# Patient Record
Sex: MALE | Race: WHITE | NOT HISPANIC OR LATINO | Employment: OTHER | URBAN - METROPOLITAN AREA
[De-identification: names, ages, dates, MRNs, and addresses within clinical notes are randomized per-mention and may not be internally consistent; named-entity substitution may affect disease eponyms.]

---

## 2019-01-11 PROBLEM — R06.00 DYSPNEA: Status: ACTIVE | Noted: 2019-01-11

## 2019-01-12 PROBLEM — E11.9 DM2 (DIABETES MELLITUS, TYPE 2): Chronic | Status: ACTIVE | Noted: 2019-01-12

## 2019-01-12 PROBLEM — R55 NEAR SYNCOPE: Status: ACTIVE | Noted: 2019-01-12

## 2019-01-12 PROBLEM — E87.6 HYPOKALEMIA: Status: ACTIVE | Noted: 2019-01-12

## 2019-01-12 PROBLEM — I44.30 AVB (ATRIOVENTRICULAR BLOCK): Status: ACTIVE | Noted: 2019-01-12

## 2019-01-13 PROBLEM — R55 SYNCOPE: Status: ACTIVE | Noted: 2019-01-13

## 2019-02-15 PROBLEM — I26.99 ACUTE PULMONARY EMBOLISM: Status: ACTIVE | Noted: 2019-02-15

## 2019-02-15 PROBLEM — I26.99 PULMONARY EMBOLUS: Status: ACTIVE | Noted: 2019-02-15

## 2019-02-17 PROBLEM — I26.99 PE (PULMONARY THROMBOEMBOLISM): Status: RESOLVED | Noted: 2019-02-15 | Resolved: 2019-02-17

## 2019-02-17 PROBLEM — Z51.81 ENCOUNTER FOR MONITORING ANTIPLATELET THERAPY: Status: ACTIVE | Noted: 2019-02-17

## 2019-02-17 PROBLEM — Z79.02 ENCOUNTER FOR MONITORING ANTIPLATELET THERAPY: Status: ACTIVE | Noted: 2019-02-17

## 2019-02-17 PROBLEM — Z95.0 PACEMAKER: Status: ACTIVE | Noted: 2019-02-17

## 2019-02-17 PROBLEM — I82.409 ACUTE DVT (DEEP VENOUS THROMBOSIS): Status: ACTIVE | Noted: 2019-02-17

## 2021-02-02 PROBLEM — G46.3 BRAINSTEM STROKE SYNDROME: Status: ACTIVE | Noted: 2021-02-02

## 2021-02-02 PROBLEM — I63.9 CEREBROVASCULAR ACCIDENT (CVA): Status: ACTIVE | Noted: 2021-02-02

## 2021-02-03 PROBLEM — H53.2 DIPLOPIA: Status: ACTIVE | Noted: 2021-02-03

## 2023-10-02 PROBLEM — L03.90 CELLULITIS: Status: ACTIVE | Noted: 2023-10-02

## 2023-10-02 PROBLEM — N18.31 STAGE 3A CHRONIC KIDNEY DISEASE: Status: ACTIVE | Noted: 2023-10-02

## 2023-10-02 PROBLEM — D72.829 LEUKOCYTOSIS: Status: ACTIVE | Noted: 2023-10-02

## 2023-10-02 PROBLEM — D64.9 NORMOCYTIC ANEMIA: Status: ACTIVE | Noted: 2023-10-02

## 2023-10-02 PROBLEM — I50.32 CHRONIC HEART FAILURE WITH PRESERVED EJECTION FRACTION (HFPEF): Status: ACTIVE | Noted: 2023-10-02

## 2023-10-02 PROBLEM — D53.9 MACROCYTIC ANEMIA: Status: ACTIVE | Noted: 2023-10-02

## 2023-10-02 PROBLEM — E80.6 HYPERBILIRUBINEMIA: Status: ACTIVE | Noted: 2023-10-02

## 2023-10-02 PROBLEM — E66.09 CLASS 1 OBESITY DUE TO EXCESS CALORIES WITH SERIOUS COMORBIDITY AND BODY MASS INDEX (BMI) OF 31.0 TO 31.9 IN ADULT: Status: ACTIVE | Noted: 2023-10-02

## 2023-10-02 PROBLEM — I48.0 PAF (PAROXYSMAL ATRIAL FIBRILLATION): Status: ACTIVE | Noted: 2023-10-02

## 2023-10-02 PROBLEM — Z79.899 POLYPHARMACY: Status: ACTIVE | Noted: 2019-02-17

## 2023-10-05 PROBLEM — M65.10 INFECTIOUS TENOSYNOVITIS: Status: ACTIVE | Noted: 2023-10-05

## 2023-10-09 PROBLEM — L02.413 ABSCESS OF RIGHT UPPER EXTREMITY: Status: ACTIVE | Noted: 2023-10-09

## 2023-10-09 PROBLEM — E63.9 INADEQUATE DIETARY ENERGY INTAKE: Status: ACTIVE | Noted: 2023-10-09

## 2023-10-14 ENCOUNTER — LAB VISIT (OUTPATIENT)
Dept: LAB | Facility: HOSPITAL | Age: 88
End: 2023-10-14
Attending: HOSPITALIST
Payer: MEDICARE

## 2023-10-14 DIAGNOSIS — R30.0 DYSURIA: Primary | ICD-10-CM

## 2023-10-14 LAB
BILIRUB UR QL STRIP: NEGATIVE
CLARITY UR: CLEAR
COLOR UR: YELLOW
GLUCOSE UR QL STRIP: NEGATIVE
HGB UR QL STRIP: ABNORMAL
KETONES UR QL STRIP: NEGATIVE
LEUKOCYTE ESTERASE UR QL STRIP: NEGATIVE
NITRITE UR QL STRIP: NEGATIVE
PH UR STRIP: 8 [PH] (ref 5–8)
PROT UR QL STRIP: NEGATIVE
SP GR UR STRIP: 1.02 (ref 1–1.03)
URN SPEC COLLECT METH UR: ABNORMAL
UROBILINOGEN UR STRIP-ACNC: NEGATIVE EU/DL

## 2023-10-14 PROCEDURE — 81003 URINALYSIS AUTO W/O SCOPE: CPT

## 2023-10-14 PROCEDURE — 87086 URINE CULTURE/COLONY COUNT: CPT

## 2023-10-15 LAB — BACTERIA UR CULT: NO GROWTH

## 2023-10-25 ENCOUNTER — HOSPITAL ENCOUNTER (INPATIENT)
Facility: HOSPITAL | Age: 88
LOS: 6 days | Discharge: ANOTHER HOSPITAL | DRG: 698 | End: 2023-10-31
Attending: EMERGENCY MEDICINE | Admitting: FAMILY MEDICINE
Payer: MEDICARE

## 2023-10-25 DIAGNOSIS — J81.1 PULMONARY EDEMA: ICD-10-CM

## 2023-10-25 DIAGNOSIS — R06.00 DYSPNEA: ICD-10-CM

## 2023-10-25 DIAGNOSIS — A41.50 SEPSIS DUE TO GRAM-NEGATIVE UTI: Primary | ICD-10-CM

## 2023-10-25 DIAGNOSIS — N39.0 SEPSIS DUE TO GRAM-NEGATIVE UTI: Primary | ICD-10-CM

## 2023-10-25 DIAGNOSIS — I50.9 HF (HEART FAILURE): ICD-10-CM

## 2023-10-25 DIAGNOSIS — D70.3 NEUTROPENIA ASSOCIATED WITH INFECTION: ICD-10-CM

## 2023-10-25 DIAGNOSIS — R94.31 PROLONGED QT INTERVAL: ICD-10-CM

## 2023-10-25 LAB
ALBUMIN SERPL BCP-MCNC: 2.3 G/DL (ref 3.5–5.2)
ALP SERPL-CCNC: 90 U/L (ref 55–135)
ALT SERPL W/O P-5'-P-CCNC: 20 U/L (ref 10–44)
ANION GAP SERPL CALC-SCNC: 10 MMOL/L (ref 8–16)
AST SERPL-CCNC: 35 U/L (ref 10–40)
BACTERIA #/AREA URNS HPF: ABNORMAL /HPF
BASOPHILS # BLD AUTO: 0.01 K/UL (ref 0–0.2)
BASOPHILS NFR BLD: 0.2 % (ref 0–1.9)
BILIRUB SERPL-MCNC: 0.7 MG/DL (ref 0.1–1)
BILIRUB UR QL STRIP: NEGATIVE
BNP SERPL-MCNC: 217 PG/ML (ref 0–99)
BUN SERPL-MCNC: 16 MG/DL (ref 8–23)
CALCIUM SERPL-MCNC: 7.9 MG/DL (ref 8.7–10.5)
CHLORIDE SERPL-SCNC: 103 MMOL/L (ref 95–110)
CK SERPL-CCNC: 71 U/L (ref 20–200)
CLARITY UR: ABNORMAL
CO2 SERPL-SCNC: 22 MMOL/L (ref 23–29)
COLOR UR: YELLOW
CREAT SERPL-MCNC: 1.4 MG/DL (ref 0.5–1.4)
DIFFERENTIAL METHOD: ABNORMAL
EOSINOPHIL # BLD AUTO: 0 K/UL (ref 0–0.5)
EOSINOPHIL NFR BLD: 0 % (ref 0–8)
ERYTHROCYTE [DISTWIDTH] IN BLOOD BY AUTOMATED COUNT: 13.5 % (ref 11.5–14.5)
EST. GFR  (NO RACE VARIABLE): 49 ML/MIN/1.73 M^2
GLUCOSE SERPL-MCNC: 93 MG/DL (ref 70–110)
GLUCOSE UR QL STRIP: NEGATIVE
HCT VFR BLD AUTO: 30.9 % (ref 40–54)
HGB BLD-MCNC: 10.4 G/DL (ref 14–18)
HGB UR QL STRIP: ABNORMAL
IMM GRANULOCYTES # BLD AUTO: 0.02 K/UL (ref 0–0.04)
IMM GRANULOCYTES NFR BLD AUTO: 0.4 % (ref 0–0.5)
KETONES UR QL STRIP: NEGATIVE
LACTATE SERPL-SCNC: 0.7 MMOL/L (ref 0.5–2.2)
LEUKOCYTE ESTERASE UR QL STRIP: ABNORMAL
LYMPHOCYTES # BLD AUTO: 0.7 K/UL (ref 1–4.8)
LYMPHOCYTES NFR BLD: 13.2 % (ref 18–48)
MCH RBC QN AUTO: 36.9 PG (ref 27–31)
MCHC RBC AUTO-ENTMCNC: 33.7 G/DL (ref 32–36)
MCV RBC AUTO: 110 FL (ref 82–98)
MICROSCOPIC COMMENT: ABNORMAL
MONOCYTES # BLD AUTO: 0.9 K/UL (ref 0.3–1)
MONOCYTES NFR BLD: 18.8 % (ref 4–15)
NEUTROPHILS # BLD AUTO: 3.3 K/UL (ref 1.8–7.7)
NEUTROPHILS NFR BLD: 67.4 % (ref 38–73)
NITRITE UR QL STRIP: POSITIVE
NRBC BLD-RTO: 0 /100 WBC
PH UR STRIP: 5 [PH] (ref 5–8)
PLATELET # BLD AUTO: 168 K/UL (ref 150–450)
PMV BLD AUTO: 9.5 FL (ref 9.2–12.9)
POTASSIUM SERPL-SCNC: 4 MMOL/L (ref 3.5–5.1)
PROT SERPL-MCNC: 6 G/DL (ref 6–8.4)
PROT UR QL STRIP: NEGATIVE
RBC # BLD AUTO: 2.82 M/UL (ref 4.6–6.2)
RBC #/AREA URNS HPF: 4 /HPF (ref 0–4)
SARS-COV-2 RDRP RESP QL NAA+PROBE: POSITIVE
SODIUM SERPL-SCNC: 135 MMOL/L (ref 136–145)
SP GR UR STRIP: 1.01 (ref 1–1.03)
TROPONIN I SERPL DL<=0.01 NG/ML-MCNC: 0.05 NG/ML (ref 0–0.03)
TROPONIN I SERPL DL<=0.01 NG/ML-MCNC: 0.09 NG/ML (ref 0–0.03)
URN SPEC COLLECT METH UR: ABNORMAL
UROBILINOGEN UR STRIP-ACNC: NEGATIVE EU/DL
WBC # BLD AUTO: 4.94 K/UL (ref 3.9–12.7)
WBC #/AREA URNS HPF: 20 /HPF (ref 0–5)
WBC CLUMPS URNS QL MICRO: ABNORMAL
YEAST URNS QL MICRO: ABNORMAL

## 2023-10-25 PROCEDURE — 99285 EMERGENCY DEPT VISIT HI MDM: CPT | Mod: 25

## 2023-10-25 PROCEDURE — 25000003 PHARM REV CODE 250: Performed by: EMERGENCY MEDICINE

## 2023-10-25 PROCEDURE — 87186 SC STD MICRODIL/AGAR DIL: CPT | Performed by: EMERGENCY MEDICINE

## 2023-10-25 PROCEDURE — 36415 COLL VENOUS BLD VENIPUNCTURE: CPT | Performed by: EMERGENCY MEDICINE

## 2023-10-25 PROCEDURE — U0002 COVID-19 LAB TEST NON-CDC: HCPCS | Performed by: STUDENT IN AN ORGANIZED HEALTH CARE EDUCATION/TRAINING PROGRAM

## 2023-10-25 PROCEDURE — 83880 ASSAY OF NATRIURETIC PEPTIDE: CPT | Performed by: EMERGENCY MEDICINE

## 2023-10-25 PROCEDURE — 81000 URINALYSIS NONAUTO W/SCOPE: CPT | Performed by: EMERGENCY MEDICINE

## 2023-10-25 PROCEDURE — 25500020 PHARM REV CODE 255: Performed by: EMERGENCY MEDICINE

## 2023-10-25 PROCEDURE — 84484 ASSAY OF TROPONIN QUANT: CPT | Mod: 91 | Performed by: EMERGENCY MEDICINE

## 2023-10-25 PROCEDURE — 82550 ASSAY OF CK (CPK): CPT | Performed by: EMERGENCY MEDICINE

## 2023-10-25 PROCEDURE — 87088 URINE BACTERIA CULTURE: CPT | Performed by: EMERGENCY MEDICINE

## 2023-10-25 PROCEDURE — 93005 ELECTROCARDIOGRAM TRACING: CPT

## 2023-10-25 PROCEDURE — 84484 ASSAY OF TROPONIN QUANT: CPT | Performed by: STUDENT IN AN ORGANIZED HEALTH CARE EDUCATION/TRAINING PROGRAM

## 2023-10-25 PROCEDURE — 11000001 HC ACUTE MED/SURG PRIVATE ROOM

## 2023-10-25 PROCEDURE — 87086 URINE CULTURE/COLONY COUNT: CPT | Performed by: EMERGENCY MEDICINE

## 2023-10-25 PROCEDURE — 93010 EKG 12-LEAD: ICD-10-PCS | Mod: ,,, | Performed by: INTERNAL MEDICINE

## 2023-10-25 PROCEDURE — 80053 COMPREHEN METABOLIC PANEL: CPT | Performed by: EMERGENCY MEDICINE

## 2023-10-25 PROCEDURE — 51702 INSERT TEMP BLADDER CATH: CPT

## 2023-10-25 PROCEDURE — 36415 COLL VENOUS BLD VENIPUNCTURE: CPT | Performed by: STUDENT IN AN ORGANIZED HEALTH CARE EDUCATION/TRAINING PROGRAM

## 2023-10-25 PROCEDURE — 83605 ASSAY OF LACTIC ACID: CPT | Performed by: EMERGENCY MEDICINE

## 2023-10-25 PROCEDURE — 87077 CULTURE AEROBIC IDENTIFY: CPT | Performed by: EMERGENCY MEDICINE

## 2023-10-25 PROCEDURE — 93010 ELECTROCARDIOGRAM REPORT: CPT | Mod: ,,, | Performed by: INTERNAL MEDICINE

## 2023-10-25 PROCEDURE — 96365 THER/PROPH/DIAG IV INF INIT: CPT

## 2023-10-25 PROCEDURE — 63600175 PHARM REV CODE 636 W HCPCS: Performed by: EMERGENCY MEDICINE

## 2023-10-25 PROCEDURE — 85025 COMPLETE CBC W/AUTO DIFF WBC: CPT | Performed by: EMERGENCY MEDICINE

## 2023-10-25 PROCEDURE — 87040 BLOOD CULTURE FOR BACTERIA: CPT | Mod: 59 | Performed by: EMERGENCY MEDICINE

## 2023-10-25 RX ORDER — LEVOFLOXACIN 5 MG/ML
750 INJECTION, SOLUTION INTRAVENOUS
Status: COMPLETED | OUTPATIENT
Start: 2023-10-25 | End: 2023-10-25

## 2023-10-25 RX ORDER — ACETAMINOPHEN 500 MG
1000 TABLET ORAL
Status: COMPLETED | OUTPATIENT
Start: 2023-10-25 | End: 2023-10-25

## 2023-10-25 RX ADMIN — ACETAMINOPHEN 1000 MG: 500 TABLET ORAL at 03:10

## 2023-10-25 RX ADMIN — IOHEXOL 100 ML: 350 INJECTION, SOLUTION INTRAVENOUS at 07:10

## 2023-10-25 RX ADMIN — LEVOFLOXACIN 750 MG: 750 INJECTION, SOLUTION INTRAVENOUS at 05:10

## 2023-10-25 RX ADMIN — SODIUM CHLORIDE 1000 ML: 9 INJECTION, SOLUTION INTRAVENOUS at 05:10

## 2023-10-25 NOTE — ED NOTES
Hourly Patient Rounds     Patient lying in bed, on back, and resting. Family absent at bedside.   Side rails up x 2 Call light within reach.   Pt AAO x 2 and in no distress.  Pain, position, and potty addressed.    Updated pt on plan of care and answered all questions.   Door closed for privacy.    Spoke with pt daughter, Rahel. POC discussed. Requested updates going forward.

## 2023-10-25 NOTE — ED NOTES
Pt escorted from triage/moved over from ambulance stretcher to ER stretcher.   Pt placed on cardiac monitor, continuous pulse ox, and NIBP cuff set to cycle.   Pt ( & visitors) educated on cardiac monitoring.

## 2023-10-25 NOTE — ED NOTES
Hourly Patient Rounds     Patient lying in bed, on back, and resting. Family absent at bedside.   Side rails up x 2. Call light within reach.   Pt AAO x 2 and in no distress.  Pain, position, and potty addressed.    Updated pt on plan of care and answered all questions.   Door closed for privacy.

## 2023-10-25 NOTE — ED PROVIDER NOTES
[facility]                                                  Chief Complaint  88 y.o. male with Fever (BIBA from [facility] with reports of positive covid, fever, hypoxia (84%), and generalized weakness x 2 days. /)    History of Present Illness  Debbie Zepeda presents to the emergency room brought in by EMS from [facility].  Patient was sent over for fever, hypoxia, generalized weakness and known positive COVID test recently.  On arrival patient is not hypoxic but rather has 97% saturation on room air.  Patient states that his back is hurting him.  He appears dehydrated.    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     Past Surgical History:   Procedure Laterality Date    CHOLECYSTECTOMY      INCISION AND DRAINAGE OF HAND Right 10/6/2023    Procedure: INCISION AND DRAINAGE, HAND, IRRIGATION AND DRAINAGE;  Surgeon: Peter Silvestre MD;  Location: [facility] OR;  Service: Orthopedics;  Laterality: Right;  hand  local requested per the family  7am per Mahsa    INSERTION OF PACEMAKER N/A 1/14/2019    Procedure: INSERTION, PACEMAKER;  Surgeon: Sabino Galicia MD;  Location: [facility] CATH;  Service: Cardiology;  Laterality: N/A;      Review of patient's allergies indicates:  No Known Allergies     Review of Systems and Physical Exam     Review of Systems  -- Constitution - has fever, no weight loss, no loss of consciousness known COVID  -- Eyes - no changes in vision, no redness, no swelling  -- Ear, Nose - no  earache, denies congestion  -- Mouth,Throat - no sore throat, no toothache, normal voice, normal swallowing  -- Respiratory - denies cough and congestion, no shortness of breath, no wheezing  -- Cardiovascular - denies chest pain, no palpitations,   -- Gastrointestinal - denies abdominal pain, denies nausea, vomiting, and  "diarrhea  -- Genitourinary - no dysuria, no denies flank pain, no hematuria or frequency   -- Musculoskeletal - reports back pain, negative for myalgias and arthralgias   -- Neurological - no headache, no neurologic changes, no loss of bladder or bowel function no seizure like activity, no changes in hearing or vision  -- Skin - denies skin changes, no rash, no hives, no suspected skin infection    Vital Signs   height is 6' 2" (1.88 m). His oral temperature is 98.3 °F (36.8 °C). His blood pressure is 113/55 (abnormal) and his pulse is 60. His respiration is 31 (abnormal) and oxygen saturation is 97%.      Physical Exam  -- Nursing note and vitals reviewed  -- Constitutional:  Awake alert and oriented to self and situation not time GCS 15, no acute distress.  Appears well.  -- Head: Atraumatic. Normocephalic. No obvious abnormality  -- Eyes: Pupils are equal and reactive to light. Extraocular movements intact. No nystagmus.  No periorbital swelling. Normal conjunctiva.  -- Nose: Nose grossly normal in appearance, nares grossly normal. No rhinorrhea.  -- Throat: Mucous membranes dry pharynx normal, normal tonsils.  Airway patent.  -- Ears: External ears and TM normal bilaterally. Normal hearing.   -- Neck: Normal range of motion. Neck supple. No meningismus. No adenopathy  -- Cardiac: Normal rate, regular rhythm and normal heart sounds. No carotid bruit. No lower extremity edema.  -- Pulmonary: Normal respiratory effort, breath sounds equal bilaterally. Adequate flow.  No wheezing.  Faint bilateral crackles.  -- Abdominal: Soft, no tenderness, no guarding, no rebound. Normal bowel sounds.   -- Musculoskeletal: Normal range of motion, all 4 extremities 5/5 strength.  Neurovascularly intact. Atraumatic. No deformities.  -- Neurological:  Cranial nerves 2-12 grossly intact. No focal deficits.   -- Vascular: Posterior tibial, dorsalis pedis and radial pulses 2+ bilaterally    -- Lymphatics: No cervical or peripheral " lymphadenopathy.   -- Skin: Warm and dry. No evidence of rash or cellulitis  -- Psychiatric: Normal mood and affect. Bedside behavior is appropriate.  Patient is cooperative.  Denies suicidal homicidal ideation.    Emergency Room Course     Treatment Course, Evaluation, and Medical Decision Making  1. Physical exam significant for dry mucous membranes and faint bilateral crackles patient meets sepsis criteria  2.  EKG atrial fibrillation with unknown chronicity  3. CBC/CMP with baseline anemia and hypocalcemia  4. Urinalysis within nitrite positive UTI, many bacteria, urine culture pending.  Patient given Levaquin IV  5. Chest x-ray with left-sided pleural effusion  6. CT chest abdomen and pelvis pending   7. BNP within normal limits  8. Troponin pending   9. Blood cultures pending lactic acid  10. Care transitioned to Dr. Jenkins at 1800       Abnormal lab values  Labs Reviewed   CBC W/ AUTO DIFFERENTIAL - Abnormal; Notable for the following components:       Result Value    RBC 2.82 (*)     Hemoglobin 10.4 (*)     Hematocrit 30.9 (*)      (*)     MCH 36.9 (*)     Lymph # 0.7 (*)     Lymph % 13.2 (*)     Mono % 18.8 (*)     All other components within normal limits   COMPREHENSIVE METABOLIC PANEL - Abnormal; Notable for the following components:    Sodium 135 (*)     CO2 22 (*)     Calcium 7.9 (*)     Albumin 2.3 (*)     eGFR 49 (*)     All other components within normal limits   B-TYPE NATRIURETIC PEPTIDE - Abnormal; Notable for the following components:     (*)     All other components within normal limits   URINALYSIS - Abnormal; Notable for the following components:    Appearance, UA Hazy (*)     Occult Blood UA 1+ (*)     Nitrite, UA Positive (*)     Leukocytes, UA 1+ (*)     All other components within normal limits   URINALYSIS MICROSCOPIC - Abnormal; Notable for the following components:    WBC, UA 20 (*)     WBC Clumps, UA Occasional (*)     Bacteria Many (*)     Yeast, UA Few (*)     All  other components within normal limits   CULTURE, URINE       Medications Given  Medications   levoFLOXacin 750 mg/150 mL IVPB 750 mg (has no administration in time range)   acetaminophen tablet 1,000 mg (1,000 mg Oral Given 10/25/23 1501)              Suri Blum MD  10/25/23 1852       Suri Blum MD  10/25/23 0499

## 2023-10-26 PROBLEM — A41.50 SEPSIS DUE TO GRAM-NEGATIVE UTI: Status: ACTIVE | Noted: 2023-10-26

## 2023-10-26 PROBLEM — E11.59 TYPE 2 DIABETES MELLITUS WITH CIRCULATORY DISORDER: Status: ACTIVE | Noted: 2019-01-12

## 2023-10-26 PROBLEM — N39.0 SEPSIS DUE TO GRAM-NEGATIVE UTI: Status: ACTIVE | Noted: 2023-10-26

## 2023-10-26 PROBLEM — N18.9 ACUTE KIDNEY INJURY SUPERIMPOSED ON CKD: Status: ACTIVE | Noted: 2023-10-26

## 2023-10-26 PROBLEM — I48.91 ATRIAL FIBRILLATION: Status: ACTIVE | Noted: 2023-10-02

## 2023-10-26 PROBLEM — U07.1 COVID-19: Status: ACTIVE | Noted: 2023-10-26

## 2023-10-26 PROBLEM — N17.9 ACUTE KIDNEY INJURY SUPERIMPOSED ON CKD: Status: ACTIVE | Noted: 2023-10-26

## 2023-10-26 PROBLEM — N39.0 COMPLICATED URINARY TRACT INFECTION: Status: ACTIVE | Noted: 2023-10-26

## 2023-10-26 PROBLEM — J90 PLEURAL EFFUSION: Status: ACTIVE | Noted: 2023-10-26

## 2023-10-26 LAB
ALBUMIN SERPL BCP-MCNC: 2.1 G/DL (ref 3.5–5.2)
ALP SERPL-CCNC: 90 U/L (ref 55–135)
ALT SERPL W/O P-5'-P-CCNC: 19 U/L (ref 10–44)
ANION GAP SERPL CALC-SCNC: 11 MMOL/L (ref 8–16)
ASCENDING AORTA: 3.08 CM
AST SERPL-CCNC: 37 U/L (ref 10–40)
AV INDEX (PROSTH): 0.91
AV MEAN GRADIENT: 1 MMHG
AV PEAK GRADIENT: 2 MMHG
AV VALVE AREA BY VELOCITY RATIO: 4.37 CM²
AV VALVE AREA: 4.13 CM²
AV VELOCITY RATIO: 0.96
BASOPHILS # BLD AUTO: 0.01 K/UL (ref 0–0.2)
BASOPHILS NFR BLD: 0.3 % (ref 0–1.9)
BILIRUB SERPL-MCNC: 0.6 MG/DL (ref 0.1–1)
BUN SERPL-MCNC: 20 MG/DL (ref 8–23)
CALCIUM SERPL-MCNC: 7.7 MG/DL (ref 8.7–10.5)
CHLORIDE SERPL-SCNC: 103 MMOL/L (ref 95–110)
CO2 SERPL-SCNC: 21 MMOL/L (ref 23–29)
CREAT SERPL-MCNC: 1.3 MG/DL (ref 0.5–1.4)
CV ECHO LV RWT: 0.54 CM
DIFFERENTIAL METHOD: ABNORMAL
DOP CALC AO PEAK VEL: 0.72 M/S
DOP CALC AO VTI: 12.7 CM
DOP CALC LVOT AREA: 4.6 CM2
DOP CALC LVOT DIAMETER: 2.41 CM
DOP CALC LVOT PEAK VEL: 0.69 M/S
DOP CALC LVOT STROKE VOLUME: 52.43 CM3
DOP CALC RVOT PEAK VEL: 0.79 M/S
DOP CALC RVOT VTI: 15.1 CM
DOP CALCLVOT PEAK VEL VTI: 11.5 CM
E WAVE DECELERATION TIME: 288.23 MSEC
E/A RATIO: 2.52
E/E' RATIO: 11.86 M/S
ECHO LV POSTERIOR WALL: 1.19 CM (ref 0.6–1.1)
EOSINOPHIL # BLD AUTO: 0 K/UL (ref 0–0.5)
EOSINOPHIL NFR BLD: 0 % (ref 0–8)
ERYTHROCYTE [DISTWIDTH] IN BLOOD BY AUTOMATED COUNT: 13.2 % (ref 11.5–14.5)
EST. GFR  (NO RACE VARIABLE): 54 ML/MIN/1.73 M^2
FRACTIONAL SHORTENING: 20 % (ref 28–44)
GLUCOSE SERPL-MCNC: 97 MG/DL (ref 70–110)
HCT VFR BLD AUTO: 28.9 % (ref 40–54)
HGB BLD-MCNC: 9.8 G/DL (ref 14–18)
IMM GRANULOCYTES # BLD AUTO: 0.01 K/UL (ref 0–0.04)
IMM GRANULOCYTES NFR BLD AUTO: 0.3 % (ref 0–0.5)
INTERVENTRICULAR SEPTUM: 1.62 CM (ref 0.6–1.1)
IVRT: 119.89 MSEC
LA MAJOR: 6.39 CM
LA MINOR: 7.04 CM
LA WIDTH: 3.8 CM
LEFT ATRIUM SIZE: 4.64 CM
LEFT ATRIUM VOLUME INDEX MOD: 51.9 ML/M2
LEFT ATRIUM VOLUME INDEX: 44.2 ML/M2
LEFT ATRIUM VOLUME MOD: 117.91 CM3
LEFT ATRIUM VOLUME: 100.4 CM3
LEFT INTERNAL DIMENSION IN SYSTOLE: 3.54 CM (ref 2.1–4)
LEFT VENTRICLE DIASTOLIC VOLUME INDEX: 38.71 ML/M2
LEFT VENTRICLE DIASTOLIC VOLUME: 87.87 ML
LEFT VENTRICLE MASS INDEX: 106 G/M2
LEFT VENTRICLE SYSTOLIC VOLUME INDEX: 23.1 ML/M2
LEFT VENTRICLE SYSTOLIC VOLUME: 52.42 ML
LEFT VENTRICULAR INTERNAL DIMENSION IN DIASTOLE: 4.4 CM (ref 3.5–6)
LEFT VENTRICULAR MASS: 241.56 G
LV LATERAL E/E' RATIO: 11.86 M/S
LV SEPTAL E/E' RATIO: 11.86 M/S
LVOT MG: 1.33 MMHG
LVOT MV: 0.56 CM/S
LYMPHOCYTES # BLD AUTO: 1 K/UL (ref 1–4.8)
LYMPHOCYTES NFR BLD: 27.9 % (ref 18–48)
MCH RBC QN AUTO: 36.6 PG (ref 27–31)
MCHC RBC AUTO-ENTMCNC: 33.9 G/DL (ref 32–36)
MCV RBC AUTO: 108 FL (ref 82–98)
MONOCYTES # BLD AUTO: 0.6 K/UL (ref 0.3–1)
MONOCYTES NFR BLD: 16 % (ref 4–15)
MV PEAK A VEL: 0.33 M/S
MV PEAK E VEL: 0.83 M/S
MV STENOSIS PRESSURE HALF TIME: 83.59 MS
MV VALVE AREA P 1/2 METHOD: 2.63 CM2
NEUTROPHILS # BLD AUTO: 2 K/UL (ref 1.8–7.7)
NEUTROPHILS NFR BLD: 55.5 % (ref 38–73)
NRBC BLD-RTO: 0 /100 WBC
PISA TR MAX VEL: 2.43 M/S
PLATELET # BLD AUTO: 148 K/UL (ref 150–450)
PMV BLD AUTO: 9.5 FL (ref 9.2–12.9)
POCT GLUCOSE: 142 MG/DL (ref 70–110)
POCT GLUCOSE: 98 MG/DL (ref 70–110)
POTASSIUM SERPL-SCNC: 3.9 MMOL/L (ref 3.5–5.1)
PROT SERPL-MCNC: 5.6 G/DL (ref 6–8.4)
PV MEAN GRADIENT: 1 MMHG
PV MV: 0.6 M/S
PV PEAK GRADIENT: 3 MMHG
PV PEAK VELOCITY: 0.8 M/S
RA MAJOR: 7.25 CM
RA PRESSURE ESTIMATED: 3 MMHG
RBC # BLD AUTO: 2.68 M/UL (ref 4.6–6.2)
RIGHT VENTRICULAR END-DIASTOLIC DIMENSION: 2.66 CM
RV TB RVSP: 5 MMHG
SINUS: 3.6 CM
SODIUM SERPL-SCNC: 135 MMOL/L (ref 136–145)
STJ: 3.17 CM
TDI LATERAL: 0.07 M/S
TDI SEPTAL: 0.07 M/S
TDI: 0.07 M/S
TR MAX PG: 24 MMHG
TV REST PULMONARY ARTERY PRESSURE: 27 MMHG
WBC # BLD AUTO: 3.62 K/UL (ref 3.9–12.7)
Z-SCORE OF LEFT VENTRICULAR DIMENSION IN END DIASTOLE: -6.51
Z-SCORE OF LEFT VENTRICULAR DIMENSION IN END SYSTOLE: -2.89

## 2023-10-26 PROCEDURE — 99900035 HC TECH TIME PER 15 MIN (STAT)

## 2023-10-26 PROCEDURE — 27000207 HC ISOLATION

## 2023-10-26 PROCEDURE — 99223 1ST HOSP IP/OBS HIGH 75: CPT | Mod: ,,, | Performed by: PHYSICIAN ASSISTANT

## 2023-10-26 PROCEDURE — 25000003 PHARM REV CODE 250: Performed by: PHYSICIAN ASSISTANT

## 2023-10-26 PROCEDURE — 36415 COLL VENOUS BLD VENIPUNCTURE: CPT | Performed by: PHYSICIAN ASSISTANT

## 2023-10-26 PROCEDURE — 94761 N-INVAS EAR/PLS OXIMETRY MLT: CPT

## 2023-10-26 PROCEDURE — 99223 PR INITIAL HOSPITAL CARE,LEVL III: ICD-10-PCS | Mod: ,,, | Performed by: PHYSICIAN ASSISTANT

## 2023-10-26 PROCEDURE — 25000003 PHARM REV CODE 250: Performed by: FAMILY MEDICINE

## 2023-10-26 PROCEDURE — 80053 COMPREHEN METABOLIC PANEL: CPT | Performed by: PHYSICIAN ASSISTANT

## 2023-10-26 PROCEDURE — 11000001 HC ACUTE MED/SURG PRIVATE ROOM

## 2023-10-26 PROCEDURE — 85025 COMPLETE CBC W/AUTO DIFF WBC: CPT | Performed by: PHYSICIAN ASSISTANT

## 2023-10-26 RX ORDER — SODIUM CHLORIDE 9 MG/ML
INJECTION, SOLUTION INTRAVENOUS CONTINUOUS
Status: DISCONTINUED | OUTPATIENT
Start: 2023-10-26 | End: 2023-10-30

## 2023-10-26 RX ORDER — HYDROCODONE BITARTRATE AND ACETAMINOPHEN 10; 325 MG/1; MG/1
1 TABLET ORAL EVERY 6 HOURS PRN
Status: DISCONTINUED | OUTPATIENT
Start: 2023-10-26 | End: 2023-10-31

## 2023-10-26 RX ORDER — LEVOFLOXACIN 5 MG/ML
750 INJECTION, SOLUTION INTRAVENOUS
Status: DISCONTINUED | OUTPATIENT
Start: 2023-10-27 | End: 2023-10-27

## 2023-10-26 RX ORDER — ZINC GLUCONATE 50 MG
50 TABLET ORAL DAILY
COMMUNITY

## 2023-10-26 RX ORDER — GABAPENTIN 100 MG/1
100 CAPSULE ORAL NIGHTLY
Status: DISCONTINUED | OUTPATIENT
Start: 2023-10-26 | End: 2023-10-31 | Stop reason: HOSPADM

## 2023-10-26 RX ORDER — IPRATROPIUM BROMIDE AND ALBUTEROL SULFATE 2.5; .5 MG/3ML; MG/3ML
3 SOLUTION RESPIRATORY (INHALATION) EVERY 6 HOURS PRN
Status: DISCONTINUED | OUTPATIENT
Start: 2023-10-26 | End: 2023-10-31 | Stop reason: HOSPADM

## 2023-10-26 RX ORDER — CHOLECALCIFEROL (VITAMIN D3) 25 MCG
1000 TABLET ORAL DAILY
COMMUNITY

## 2023-10-26 RX ORDER — IBUPROFEN 200 MG
24 TABLET ORAL
Status: DISCONTINUED | OUTPATIENT
Start: 2023-10-26 | End: 2023-10-31 | Stop reason: HOSPADM

## 2023-10-26 RX ORDER — ONDANSETRON 4 MG/1
4 TABLET, FILM COATED ORAL EVERY 8 HOURS PRN
COMMUNITY

## 2023-10-26 RX ORDER — ATORVASTATIN CALCIUM 40 MG/1
40 TABLET, FILM COATED ORAL NIGHTLY
Status: DISCONTINUED | OUTPATIENT
Start: 2023-10-26 | End: 2023-10-31 | Stop reason: HOSPADM

## 2023-10-26 RX ORDER — GLUCAGON 1 MG
1 KIT INJECTION
Status: DISCONTINUED | OUTPATIENT
Start: 2023-10-26 | End: 2023-10-31 | Stop reason: HOSPADM

## 2023-10-26 RX ORDER — LEVOFLOXACIN 5 MG/ML
500 INJECTION, SOLUTION INTRAVENOUS
Status: DISCONTINUED | OUTPATIENT
Start: 2023-10-26 | End: 2023-10-26

## 2023-10-26 RX ORDER — SODIUM CHLORIDE 0.9 % (FLUSH) 0.9 %
10 SYRINGE (ML) INJECTION
Status: DISCONTINUED | OUTPATIENT
Start: 2023-10-26 | End: 2023-10-31 | Stop reason: HOSPADM

## 2023-10-26 RX ORDER — OMEPRAZOLE 20 MG/1
20 CAPSULE, DELAYED RELEASE ORAL DAILY
COMMUNITY

## 2023-10-26 RX ORDER — TALC
6 POWDER (GRAM) TOPICAL NIGHTLY PRN
Status: DISCONTINUED | OUTPATIENT
Start: 2023-10-26 | End: 2023-10-31 | Stop reason: HOSPADM

## 2023-10-26 RX ORDER — PANTOPRAZOLE SODIUM 40 MG/1
40 TABLET, DELAYED RELEASE ORAL DAILY
Status: DISCONTINUED | OUTPATIENT
Start: 2023-10-26 | End: 2023-10-31 | Stop reason: HOSPADM

## 2023-10-26 RX ORDER — ASCORBIC ACID 500 MG
500 TABLET ORAL DAILY
COMMUNITY

## 2023-10-26 RX ORDER — IBUPROFEN 200 MG
16 TABLET ORAL
Status: DISCONTINUED | OUTPATIENT
Start: 2023-10-26 | End: 2023-10-31 | Stop reason: HOSPADM

## 2023-10-26 RX ORDER — MUPIROCIN 20 MG/G
OINTMENT TOPICAL 2 TIMES DAILY
Status: COMPLETED | OUTPATIENT
Start: 2023-10-26 | End: 2023-10-30

## 2023-10-26 RX ORDER — MULTIVITAMIN
1 TABLET ORAL DAILY
COMMUNITY

## 2023-10-26 RX ORDER — LANOLIN ALCOHOL/MO/W.PET/CERES
100 CREAM (GRAM) TOPICAL DAILY
COMMUNITY

## 2023-10-26 RX ORDER — INSULIN ASPART 100 [IU]/ML
0-5 INJECTION, SOLUTION INTRAVENOUS; SUBCUTANEOUS
Status: DISCONTINUED | OUTPATIENT
Start: 2023-10-26 | End: 2023-10-31 | Stop reason: HOSPADM

## 2023-10-26 RX ADMIN — PANTOPRAZOLE SODIUM 40 MG: 40 TABLET, DELAYED RELEASE ORAL at 09:10

## 2023-10-26 RX ADMIN — APIXABAN 5 MG: 5 TABLET, FILM COATED ORAL at 09:10

## 2023-10-26 RX ADMIN — GABAPENTIN 100 MG: 100 CAPSULE ORAL at 06:10

## 2023-10-26 RX ADMIN — HYDROCODONE BITARTRATE AND ACETAMINOPHEN 1 TABLET: 10; 325 TABLET ORAL at 06:10

## 2023-10-26 RX ADMIN — MUPIROCIN: 20 OINTMENT TOPICAL at 08:10

## 2023-10-26 RX ADMIN — SODIUM CHLORIDE: 9 INJECTION, SOLUTION INTRAVENOUS at 09:10

## 2023-10-26 RX ADMIN — SODIUM CHLORIDE: 9 INJECTION, SOLUTION INTRAVENOUS at 08:10

## 2023-10-26 RX ADMIN — APIXABAN 5 MG: 5 TABLET, FILM COATED ORAL at 08:10

## 2023-10-26 RX ADMIN — HYDROCODONE BITARTRATE AND ACETAMINOPHEN 1 TABLET: 10; 325 TABLET ORAL at 01:10

## 2023-10-26 RX ADMIN — GABAPENTIN 100 MG: 100 CAPSULE ORAL at 08:10

## 2023-10-26 RX ADMIN — ATORVASTATIN CALCIUM 40 MG: 40 TABLET, FILM COATED ORAL at 08:10

## 2023-10-26 RX ADMIN — HYDROCODONE BITARTRATE AND ACETAMINOPHEN 1 TABLET: 10; 325 TABLET ORAL at 08:10

## 2023-10-26 NOTE — ED NOTES
Bedside report given to JOEY Núñez for room 310. RN given HPI, current vitals and POC. No questions or concerns raised by receiving RN/floor.

## 2023-10-26 NOTE — HPI
Patient is an 88 year old male with medical history of HTN, HLD, PAF, CAD, HF EF45%, DM type 2, CKD stage 3 and prior stroke who was admitted to ED for urosepsis.  He was sent from nursing home with weakness, fever and hypoxia.  Patient denies any acute complaints such as cough, CP, SOB, nausea, vomiting dysuria and urinary retention.  Additionally there has been an outbreak of covid at NH and patient tested positive.        Admitted for urosepsis.  H/o neurogenic bladder and urinary retention.

## 2023-10-26 NOTE — ASSESSMENT & PLAN NOTE
Patient is identified as positive for COVID   Initiate standard COVID protocols; COVID-19 testing Collection Date: 10/12/2023 Collection Time:  12:01 PM ,Infection Control notification  and isolation- respiratory, contact and droplet per protocol    Diagnostics: daily labs     Management: none at this time.  Pt is not hypoxic     Advance Care Planning  Patient is a DNR.  [place] in chart

## 2023-10-26 NOTE — ASSESSMENT & PLAN NOTE
BNP mildly elevated   Seen on CTAP moderate left and small right pleural effusion with adjacent atelectasis

## 2023-10-26 NOTE — PLAN OF CARE
Patient admitted for IV ABX r/t UTI with chronic lugo. Bedside report received from JOEY Alarcon. A/Ox2 person/place. VS stable on RA. Sacrum excoriated, incision on right hand, skin tear on left hand, and skin tear to right outer leg. Some complaints of pain, reordered home PRN pain medicine and gabapentin. POC reviewed.

## 2023-10-26 NOTE — SUBJECTIVE & OBJECTIVE
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       Past Surgical History:   Procedure Laterality Date    CHOLECYSTECTOMY      INCISION AND DRAINAGE OF HAND Right 10/6/2023    Procedure: INCISION AND DRAINAGE, HAND, IRRIGATION AND DRAINAGE;  Surgeon: Peter Silvestre MD;  Location: [facility] OR;  Service: Orthopedics;  Laterality: Right;  hand  local requested per the family  7am per Mahsa    INSERTION OF PACEMAKER N/A 1/14/2019    Procedure: INSERTION, PACEMAKER;  Surgeon: Sabino Galicia MD;  Location: [facility] CATH;  Service: Cardiology;  Laterality: N/A;       Review of patient's allergies indicates:  No Known Allergies    No current facility-administered medications on file prior to encounter.     Current Outpatient Medications on File Prior to Encounter   Medication Sig    amLODIPine (NORVASC) 5 MG tablet Take 5 mg by mouth once daily.    apixaban (ELIQUIS) 5 mg Tab Take 1 tablet (5 mg total) by mouth 2 (two) times daily.    ascorbic acid, vitamin C, (VITAMIN C) 500 MG tablet Take 500 mg by mouth once daily.    atorvastatin (LIPITOR) 40 MG tablet Take 40 mg by mouth every evening.    furosemide (LASIX) 20 MG tablet Take 20 mg by mouth once daily.    gabapentin (NEURONTIN) 100 MG capsule Take 1 capsule (100 mg total) by mouth every evening.    HYDROcodone-acetaminophen (NORCO)  mg per tablet Take 1 tablet by mouth every 6 (six) hours as needed for Pain.    multivitamin (ONE DAILY MULTIVITAMIN) per tablet Take 1 tablet by mouth once daily.    omeprazole (PRILOSEC) 20 MG capsule Take 20 mg by mouth once daily.    ondansetron (ZOFRAN) 4 MG tablet Take 4 mg by mouth every 8 (eight) hours as needed for Nausea.    potassium chloride (KLOR-CON) 10 MEQ TbSR Take 1 tablet (10 mEq total) by mouth every other day.    thiamine 100 MG tablet Take 100 mg by  mouth once daily.    vitamin D (VITAMIN D3) 1000 units Tab Take 1,000 Units by mouth once daily.    zinc gluconate 50 mg tablet Take 50 mg by mouth once daily.    acetaminophen (TYLENOL) 650 MG Supp Place 1 suppository (650 mg total) rectally every 4 (four) hours as needed.    L.acidophil,parac-S.therm-Bif. (RISAQUAD) Cap capsule Take 1 capsule by mouth once daily. for 14 days     Family History    None       Tobacco Use    Smoking status: Never    Smokeless tobacco: Never   Substance and Sexual Activity    Alcohol use: Yes     Comment: 2-3 [place] per night.    Drug use: No    Sexual activity: Not on file     Review of Systems   Constitutional:  Negative for chills and fever.   HENT:  Negative for ear discharge and ear pain.    Eyes:  Negative for pain and discharge.   Respiratory:  Negative for cough and shortness of breath.    Cardiovascular:  Negative for chest pain and leg swelling.   Gastrointestinal:  Negative for nausea and vomiting.   Endocrine: Negative for cold intolerance and heat intolerance.   Genitourinary:  Negative for difficulty urinating and dysuria.   Musculoskeletal:  Negative for joint swelling and myalgias.   Skin:  Negative for rash and wound.   Neurological:  Negative for dizziness and headaches.   Psychiatric/Behavioral:  Negative for agitation and confusion.      Objective:     Vital Signs (Most Recent):  Temp: 96.3 °F (35.7 °C) (10/26/23 1146)  Pulse: 61 (10/26/23 1146)  Resp: 18 (10/26/23 1146)  BP: (!) 107/55 (10/26/23 1146)  SpO2: (!) 93 % (10/26/23 1146) Vital Signs (24h Range):  Temp:  [96.3 °F (35.7 °C)-100.8 °F (38.2 °C)] 96.3 °F (35.7 °C)  Pulse:  [60-90] 61  Resp:  [16-39] 18  SpO2:  [93 %-100 %] 93 %  BP: ()/(53-67) 107/55     Weight: 100.1 kg (220 lb 10.9 oz)  Body mass index is 28.33 kg/m².     Physical Exam  Constitutional:       General: He is not in acute distress.  HENT:      Head: Normocephalic and atraumatic.   Eyes:      General:         Right eye: No discharge.   "       Left eye: No discharge.   Cardiovascular:      Rate and Rhythm: Normal rate. Rhythm irregular.   Pulmonary:      Effort: Pulmonary effort is normal.      Breath sounds: Normal breath sounds.   Abdominal:      General: There is no distension.      Tenderness: There is no abdominal tenderness.   Musculoskeletal:         General: No swelling or tenderness.      Cervical back: Neck supple. No tenderness.   Skin:     General: Skin is warm and dry.   Neurological:      General: No focal deficit present.      Mental Status: He is alert and oriented to person, place, and time.      Comments: Patient's speech is delayed but eventually answers.     Psychiatric:         Mood and Affect: Mood normal.         Behavior: Behavior normal.                Significant Labs: A1C:   Recent Labs   Lab 10/02/23  1455   HGBA1C 5.4     ABGs: No results for input(s): "PH", "PCO2", "HCO3", "POCSATURATED", "BE", "TOTALHB", "COHB", "METHB", "O2HB", "POCFIO2", "PO2" in the last 48 hours.  Bilirubin:   Recent Labs   Lab 10/02/23  1430 10/03/23  0536 10/25/23  1524   BILITOT 3.9* 2.5* 0.7     Blood Culture:   Recent Labs   Lab 10/25/23  1804 10/25/23  1814   LABBLOO No Growth to date No Growth to date     BMP:   Recent Labs   Lab 10/25/23  1524   GLU 93   *   K 4.0      CO2 22*   BUN 16   CREATININE 1.4   CALCIUM 7.9*     CBC:   Recent Labs   Lab 10/25/23  1525   WBC 4.94   HGB 10.4*   HCT 30.9*        CMP:   Recent Labs   Lab 10/25/23  1524   *   K 4.0      CO2 22*   GLU 93   BUN 16   CREATININE 1.4   CALCIUM 7.9*   PROT 6.0   ALBUMIN 2.3*   BILITOT 0.7   ALKPHOS 90   AST 35   ALT 20   ANIONGAP 10     Cardiac Markers:   Recent Labs   Lab 10/25/23  1524   *     Coagulation: No results for input(s): "PT", "INR", "APTT" in the last 48 hours.  Lactic Acid:   Recent Labs   Lab 10/25/23  1804   LACTATE 0.7     Lipase: No results for input(s): "LIPASE" in the last 48 hours.  Lipid Panel: No results for " "input(s): "CHOL", "HDL", "LDLCALC", "TRIG", "CHOLHDL" in the last 48 hours.  Magnesium: No results for input(s): "MG" in the last 48 hours.  POCT Glucose: No results for input(s): "POCTGLUCOSE" in the last 48 hours.  Prealbumin: No results for input(s): "PREALBUMIN" in the last 48 hours.  Respiratory Culture: No results for input(s): "GSRESP", "RESPIRATORYC" in the last 48 hours.  Troponin:   Recent Labs   Lab 10/25/23  1804 10/25/23  2058   TROPONINI 0.050* 0.087*     TSH: No results for input(s): "TSH" in the last 4320 hours.  Urine Culture: No results for input(s): "LABURIN" in the last 48 hours.  Urine Studies:   Recent Labs   Lab 10/25/23  1541   COLORU Yellow   APPEARANCEUA Hazy*   PHUR 5.0   SPECGRAV 1.015   PROTEINUA Negative   GLUCUA Negative   KETONESU Negative   BILIRUBINUA Negative   OCCULTUA 1+*   NITRITE Positive*   UROBILINOGEN Negative   LEUKOCYTESUR 1+*   RBCUA 4   WBCUA 20*   BACTERIA Many*       Significant Imaging: I have reviewed all pertinent imaging results/findings within the past 24 hours.  "

## 2023-10-26 NOTE — NURSING
Received call from Dr. Herrera. Orders to leave lugo in place for 1 week- to be removed outpatient. Notify MD if not draining.

## 2023-10-26 NOTE — PROGRESS NOTES
Pharmacist Renal Dose Adjustment Note    Debbie Zepeda is a 88 y.o. male being treated with the medication levofloxacin     Patient Data:    Vital Signs (Most Recent):  Temp: 98.4 °F (36.9 °C) (10/25/23 2357)  Pulse: 62 (10/26/23 0013)  Resp: 20 (10/25/23 2357)  BP: 112/67 (10/25/23 2357)  SpO2: 98 % (10/25/23 2357) Vital Signs (72h Range):  Temp:  [98.3 °F (36.8 °C)-100.8 °F (38.2 °C)]   Pulse:  [60-90]   Resp:  [20-39]   BP: (104-136)/(53-67)   SpO2:  [95 %-100 %]      Recent Labs   Lab 10/25/23  1524   CREATININE 1.4     Serum creatinine: 1.4 mg/dL 10/25/23 1524  Estimated creatinine clearance: 47.9 mL/min    Medication:levofloxacin dose: 500 mg frequency q24h will be changed to medication:levofloxacin dose:750 mg frequency:q48h    Pharmacist's Name: Valarie Loredo  Pharmacist's Extension: [phone]

## 2023-10-26 NOTE — ED NOTES
[facility] contacted to update RN on patient current plan of care and condition. Notified that patient will be admitted to med surg. Spoke to JOEY Santamaria. Verbalized understanding. Contacted Patient daughter per his request. Spoke with Rahel. Updated on patient current plan of care and condition. Family verbalized understanding.

## 2023-10-26 NOTE — PLAN OF CARE
[facility] - Med Surg (3rd Fl)  Initial Discharge Assessment       Primary Care Provider: Nigel Webb MD    Admission Diagnosis: Dyspnea [R06.00]    Admission Date: 10/25/2023  Expected Discharge Date:     Transition of Care Barriers: (P) None    Payor: BCBS [place] MEDICARE / Plan: BCBS [facility] / Product Type: Medicare Advantage /     Extended Emergency Contact Information  Primary Emergency Contact: Rahel Kaminski  Mobile Phone: [phone]  Relation: Daughter   needed? No  Secondary Emergency Contact: Alley Oden  Mobile Phone: [phone]  Relation: Daughter   needed? No    Discharge Plan A: (P) Skilled Nursing Facility  Discharge Plan B: (P) Return to Nursing Home, Home Health      [facility] - [facility] [address]  [address]  [facility] [address]  Phone: [phone] Fax: [phone]    [id number] - [place] - [address] AT [facility]  [address]  [place] [address]  Phone: [phone] Fax: [phone]      Initial Assessment (most recent)       Adult Discharge Assessment - 10/26/23 1353          Discharge Assessment    Assessment Type Discharge Planning Assessment (P)      Confirmed/corrected address, phone number and insurance Yes (P)      Confirmed Demographics Correct on Facesheet (P)      Source of Information health record;family (P)      Communicated EDMOND with patient/caregiver Yes (P)      Reason For Admission [facility] Medicare (P)      People in Home facility resident (P)      Facility Arrived From: [facility] (P)      Do you expect to return to your current living situation? Yes (P)      Do you have help at home or someone to help you manage your care at home? Yes (P)      Who are your caregiver(s) and their phone number(s)? [facility] staff (P)      Prior to hospitilization cognitive status: Alert/Oriented (P)      Current cognitive status: Alert/Oriented (P)      Walking or Climbing Stairs  ambulation difficulty, requires equipment (P)      Mobility Management uses rolling walker (P)      Dressing/Bathing bathing difficulty, assistance 1 person (P)      Dressing/Bathing Management [facility] staff assist (P)      Home Accessibility wheelchair accessible (P)      Home Layout Able to live on 1st floor (P)      Equipment Currently Used at Home hospital bed;wheelchair;walker, rolling (P)      Readmission within 30 days? Yes (P)      Patient currently being followed by outpatient case management? No (P)      Do you currently have service(s) that help you manage your care at home? No (P)      Do you take prescription medications? Yes (P)      Do you have prescription coverage? Yes (P)      Coverage BCBS medicare (P)      Do you have any problems affording any of your prescribed medications? No (P)      Is the patient taking medications as prescribed? yes (P)      Who is going to help you get home at discharge? [facility] staff (P)      How do you get to doctors appointments? agency (P)      Are you on dialysis? No (P)      Do you take coumadin? No (P)      DME Needed Upon Discharge  none (P)      Discharge Plan discussed with: Patient (P)      Transition of Care Barriers None (P)      Discharge Plan A Skilled Nursing Facility (P)      Discharge Plan B Return to Nursing Home;Home Health (P)                       Discharge assessment complete via medical record and with daughter via phone as patient did not answer phone and patient in covid 19 precautions.   Rahel states patient was at [facility] for skilled care. CM did message [facility] and ask them to submit to payor for authorization to go back as skilled, so this will not be a barrier at discharge. Rahel has concerns about patient having lugo catheter. Concerns relayed to MACIEJ Sandoval.  PT OT eval needs to be done to prove patient still needs SNF care. CM notified MACIEJ Sandoval of orders needed.   Family encouraged to call with any needs/concerns.   CM to remain  available.

## 2023-10-26 NOTE — ASSESSMENT & PLAN NOTE
"Patient's FSGs are controlled on current medication regimen.  Last A1c reviewed-   Lab Results   Component Value Date    HGBA1C 5.4 10/02/2023     Most recent fingerstick glucose reviewed- No results for input(s): "POCTGLUCOSE" in the last 24 hours.  Current correctional scale  Low  Maintain anti-hyperglycemic dose as follows-   Antihyperglycemics (From admission, onward)    Start     Stop Route Frequency Ordered    10/26/23 1315  insulin aspart U-100 pen 0-5 Units         -- SubQ Before meals & nightly PRN 10/26/23 1216        Hold Oral hypoglycemics while patient is in the hospital.  "

## 2023-10-26 NOTE — PLAN OF CARE
Problem: Infection  Goal: Absence of Infection Signs and Symptoms  Outcome: Ongoing, Not Progressing     Problem: Adult Inpatient Plan of Care  Goal: Plan of Care Review  Outcome: Ongoing, Not Progressing  Goal: Patient-Specific Goal (Individualized)  Outcome: Ongoing, Not Progressing  Goal: Absence of Hospital-Acquired Illness or Injury  Outcome: Ongoing, Not Progressing  Goal: Optimal Comfort and Wellbeing  Outcome: Ongoing, Not Progressing  Goal: Readiness for Transition of Care  Outcome: Ongoing, Not Progressing     Problem: Diabetes Comorbidity  Goal: Blood Glucose Level Within Targeted Range  Outcome: Ongoing, Not Progressing     Problem: Impaired Wound Healing  Goal: Optimal Wound Healing  Outcome: Ongoing, Not Progressing     Problem: Fall Injury Risk  Goal: Absence of Fall and Fall-Related Injury  Outcome: Ongoing, Not Progressing     Problem: Skin Injury Risk Increased  Goal: Skin Health and Integrity  Outcome: Ongoing, Not Progressing     Problem: UTI (Urinary Tract Infection)  Goal: Improved Infection Symptoms  Outcome: Ongoing, Not Progressing     Problem: Fluid and Electrolyte Imbalance (Acute Kidney Injury/Impairment)  Goal: Fluid and Electrolyte Balance  Outcome: Ongoing, Not Progressing     Problem: Oral Intake Inadequate (Acute Kidney Injury/Impairment)  Goal: Optimal Nutrition Intake  Outcome: Ongoing, Not Progressing     Problem: Renal Function Impairment (Acute Kidney Injury/Impairment)  Goal: Effective Renal Function  Outcome: Ongoing, Not Progressing

## 2023-10-26 NOTE — H&P
[facility] ([address] [place] [address] Medicine  History & Physical    Patient Name: Debbie Zepeda  MRN: [id number]  Patient Class: IP- Inpatient  Admission Date: 10/25/2023  Attending Physician: Barron Garcia MD   Primary Care Provider: Nigel Webb MD         Patient information was obtained from patient and ER records.     Subjective:     Principal Problem:Sepsis due to gram-negative UTI    Chief Complaint:   Chief Complaint   Patient presents with    Fever     BIBA from [facility] with reports of positive covid, fever, hypoxia (84%), and generalized weakness x 2 days.           HPI: Patient is an 88 year old male with medical history of HTN, HLD, PAF, CAD, HF EF45%, DM type 2, CKD stage 3 and prior stroke who was admitted to ED for urosepsis.  He was sent from nursing home with weakness, fever and hypoxia.  Patient denies any acute complaints such as cough, CP, SOB, nausea, vomiting dysuria and urinary retention.  Additionally there has been an outbreak of covid at NH and patient tested positive.        Admitted for urosepsis.  H/o neurogenic bladder and urinary retention.        Past Medical History:   Diagnosis Date    Acute DVT (deep venous thrombosis) 2/17/2019    LLE    Cancer 1997    colon cancer    Diabetes mellitus     Hypercholesteremia     Hypertension     Pacemaker 2/17/2019       Past Surgical History:   Procedure Laterality Date    CHOLECYSTECTOMY      INCISION AND DRAINAGE OF HAND Right 10/6/2023    Procedure: INCISION AND DRAINAGE, HAND, IRRIGATION AND DRAINAGE;  Surgeon: Peter Silvestre MD;  Location: [facility] OR;  Service: Orthopedics;  Laterality: Right;  hand  local requested per the family  7am per [place]    INSERTION OF PACEMAKER N/A 1/14/2019    Procedure: INSERTION, PACEMAKER;  Surgeon: Sabino Galicia MD;  Location: [facility] CATH;  Service: Cardiology;  Laterality: N/A;       Review of patient's allergies indicates:  No Known Allergies    No current  facility-administered medications on file prior to encounter.     Current Outpatient Medications on File Prior to Encounter   Medication Sig    amLODIPine (NORVASC) 5 MG tablet Take 5 mg by mouth once daily.    apixaban (ELIQUIS) 5 mg Tab Take 1 tablet (5 mg total) by mouth 2 (two) times daily.    ascorbic acid, vitamin C, (VITAMIN C) 500 MG tablet Take 500 mg by mouth once daily.    atorvastatin (LIPITOR) 40 MG tablet Take 40 mg by mouth every evening.    furosemide (LASIX) 20 MG tablet Take 20 mg by mouth once daily.    gabapentin (NEURONTIN) 100 MG capsule Take 1 capsule (100 mg total) by mouth every evening.    HYDROcodone-acetaminophen (NORCO)  mg per tablet Take 1 tablet by mouth every 6 (six) hours as needed for Pain.    multivitamin (ONE DAILY MULTIVITAMIN) per tablet Take 1 tablet by mouth once daily.    omeprazole (PRILOSEC) 20 MG capsule Take 20 mg by mouth once daily.    ondansetron (ZOFRAN) 4 MG tablet Take 4 mg by mouth every 8 (eight) hours as needed for Nausea.    potassium chloride (KLOR-CON) 10 MEQ TbSR Take 1 tablet (10 mEq total) by mouth every other day.    thiamine 100 MG tablet Take 100 mg by mouth once daily.    vitamin D (VITAMIN D3) 1000 units Tab Take 1,000 Units by mouth once daily.    zinc gluconate 50 mg tablet Take 50 mg by mouth once daily.    acetaminophen (TYLENOL) 650 MG Supp Place 1 suppository (650 mg total) rectally every 4 (four) hours as needed.    L.acidophil,parac-S.therm-Bif. (RISAQUAD) Cap capsule Take 1 capsule by mouth once daily. for 14 days     Family History    None       Tobacco Use    Smoking status: Never    Smokeless tobacco: Never   Substance and Sexual Activity    Alcohol use: Yes     Comment: 2-3 [place] per night.    Drug use: No    Sexual activity: Not on file     Review of Systems   Constitutional:  Negative for chills and fever.   HENT:  Negative for ear discharge and ear pain.    Eyes:  Negative for pain and discharge.    Respiratory:  Negative for cough and shortness of breath.    Cardiovascular:  Negative for chest pain and leg swelling.   Gastrointestinal:  Negative for nausea and vomiting.   Endocrine: Negative for cold intolerance and heat intolerance.   Genitourinary:  Negative for difficulty urinating and dysuria.   Musculoskeletal:  Negative for joint swelling and myalgias.   Skin:  Negative for rash and wound.   Neurological:  Negative for dizziness and headaches.   Psychiatric/Behavioral:  Negative for agitation and confusion.      Objective:     Vital Signs (Most Recent):  Temp: 96.3 °F (35.7 °C) (10/26/23 1146)  Pulse: 61 (10/26/23 1146)  Resp: 18 (10/26/23 1146)  BP: (!) 107/55 (10/26/23 1146)  SpO2: (!) 93 % (10/26/23 1146) Vital Signs (24h Range):  Temp:  [96.3 °F (35.7 °C)-100.8 °F (38.2 °C)] 96.3 °F (35.7 °C)  Pulse:  [60-90] 61  Resp:  [16-39] 18  SpO2:  [93 %-100 %] 93 %  BP: ()/(53-67) 107/55     Weight: 100.1 kg (220 lb 10.9 oz)  Body mass index is 28.33 kg/m².     Physical Exam  Constitutional:       General: He is not in acute distress.  HENT:      Head: Normocephalic and atraumatic.   Eyes:      General:         Right eye: No discharge.         Left eye: No discharge.   Cardiovascular:      Rate and Rhythm: Normal rate. Rhythm irregular.   Pulmonary:      Effort: Pulmonary effort is normal.      Breath sounds: Normal breath sounds.   Abdominal:      General: There is no distension.      Tenderness: There is no abdominal tenderness.   Musculoskeletal:         General: No swelling or tenderness.      Cervical back: Neck supple. No tenderness.   Skin:     General: Skin is warm and dry.   Neurological:      General: No focal deficit present.      Mental Status: He is alert and oriented to person, place, and time.      Comments: Patient's speech is delayed but eventually answers.     Psychiatric:         Mood and Affect: Mood normal.         Behavior: Behavior normal.                Significant Labs: A1C:  "  Recent Labs   Lab 10/02/23  1455   HGBA1C 5.4     ABGs: No results for input(s): "PH", "PCO2", "HCO3", "POCSATURATED", "BE", "TOTALHB", "COHB", "METHB", "O2HB", "POCFIO2", "PO2" in the last 48 hours.  Bilirubin:   Recent Labs   Lab 10/02/23  1430 10/03/23  0536 10/25/23  1524   BILITOT 3.9* 2.5* 0.7     Blood Culture:   Recent Labs   Lab 10/25/23  1804 10/25/23  1814   LABBLOO No Growth to date No Growth to date     BMP:   Recent Labs   Lab 10/25/23  1524   GLU 93   *   K 4.0      CO2 22*   BUN 16   CREATININE 1.4   CALCIUM 7.9*     CBC:   Recent Labs   Lab 10/25/23  1525   WBC 4.94   HGB 10.4*   HCT 30.9*        CMP:   Recent Labs   Lab 10/25/23  1524   *   K 4.0      CO2 22*   GLU 93   BUN 16   CREATININE 1.4   CALCIUM 7.9*   PROT 6.0   ALBUMIN 2.3*   BILITOT 0.7   ALKPHOS 90   AST 35   ALT 20   ANIONGAP 10     Cardiac Markers:   Recent Labs   Lab 10/25/23  1524   *     Coagulation: No results for input(s): "PT", "INR", "APTT" in the last 48 hours.  Lactic Acid:   Recent Labs   Lab 10/25/23  1804   LACTATE 0.7     Lipase: No results for input(s): "LIPASE" in the last 48 hours.  Lipid Panel: No results for input(s): "CHOL", "HDL", "LDLCALC", "TRIG", "CHOLHDL" in the last 48 hours.  Magnesium: No results for input(s): "MG" in the last 48 hours.  POCT Glucose: No results for input(s): "POCTGLUCOSE" in the last 48 hours.  Prealbumin: No results for input(s): "PREALBUMIN" in the last 48 hours.  Respiratory Culture: No results for input(s): "GSRESP", "RESPIRATORYC" in the last 48 hours.  Troponin:   Recent Labs   Lab 10/25/23  1804 10/25/23  2058   TROPONINI 0.050* 0.087*     TSH: No results for input(s): "TSH" in the last 4320 hours.  Urine Culture: No results for input(s): "LABURIN" in the last 48 hours.  Urine Studies:   Recent Labs   Lab 10/25/23  1541   COLORU Yellow   APPEARANCEUA Hazy*   PHUR 5.0   SPECGRAV 1.015   PROTEINUA Negative   GLUCUA Negative   KETONESU Negative "   BILIRUBINUA Negative   OCCULTUA 1+*   NITRITE Positive*   UROBILINOGEN Negative   LEUKOCYTESUR 1+*   RBCUA 4   WBCUA 20*   BACTERIA Many*       Significant Imaging: I have reviewed all pertinent imaging results/findings within the past 24 hours.    Assessment/Plan:     * Sepsis due to gram-negative UTI  This patient does have evidence of infective focus  My overall impression is sepsis.  Source: Respiratory and Urinary Tract  Antibiotics given-   Antibiotics (72h ago, onward)    Start     Stop Route Frequency Ordered    10/27/23 1700  levoFLOXacin 750 mg/150 mL IVPB 750 mg         -- IV Every 48 hours (non-standard times) 10/26/23 0050    10/26/23 0900  mupirocin 2 % ointment         10/31/23 0859 Nasl 2 times daily 10/26/23 0046        Latest lactate reviewed-  Recent Labs   Lab 10/25/23  1804   LACTATE 0.7     Organ dysfunction indicated by Acute kidney injury and Acute heart failure    Fluid challenge Patient recevied 1 L bolus in the ED.  Will give IV fluids.  Pleural effusions seen on CTAP   Post- resuscitation assessment No - Post resuscitation assessment not needed       Will Not start Pressors- Levophed for MAP of 65  Source control achieved by: IV levofloxacin   Blood and urine culture pending        Complicated urinary tract infection  U/A with 1+ leukocytes and 20 WBC on microscopic  Urine cultures pending  Continue IV levofloxacin       Pleural effusion  BNP mildly elevated   Seen on CTAP moderate left and small right pleural effusion with adjacent atelectasis         COVID-19  Patient is identified as positive for COVID   Initiate standard COVID protocols; COVID-19 testing Collection Date: 10/12/2023 Collection Time:  12:01 PM ,Infection Control notification  and isolation- respiratory, contact and droplet per protocol    Diagnostics: daily labs     Management: none at this time.  Pt is not hypoxic     Advance Care Planning  Patient is a DNR.  Lapost in chart     Acute kidney injury superimposed on  "chronic kidney disease  Patient with acute kidney injury/acute renal failure likely due to pre-renal azotemia due to dehydration JACKELIN is currently stable. Baseline creatinine 1.0 - Labs reviewed- Renal function/electrolytes with Estimated Creatinine Clearance: 47.9 mL/min (based on SCr of 1.4 mg/dL). according to latest data. Monitor urine output and serial BMP and adjust therapy as needed. Avoid nephrotoxins and renally dose meds for GFR listed above.    IV fluids.  In the setting of JACKELIN/UTI CT AP ordered and no renal stones seen.      Atrial fibrillation  Rate controlled  Continue eliquis     Acute pulmonary embolism  Continue home Eliquis       Type 2 diabetes mellitus with circulatory disorder  Patient's FSGs are controlled on current medication regimen.  Last A1c reviewed-   Lab Results   Component Value Date    HGBA1C 5.4 10/02/2023     Most recent fingerstick glucose reviewed- No results for input(s): "POCTGLUCOSE" in the last 24 hours.  Current correctional scale  Low  Maintain anti-hyperglycemic dose as follows-   Antihyperglycemics (From admission, onward)    Start     Stop Route Frequency Ordered    10/26/23 1315  insulin aspart U-100 pen 0-5 Units         -- SubQ Before meals & nightly PRN 10/26/23 1216        Hold Oral hypoglycemics while patient is in the hospital.      VTE Risk Mitigation (From admission, onward)         Ordered     apixaban tablet 5 mg  2 times daily         10/26/23 0859     IP VTE HIGH RISK PATIENT  Once         10/26/23 0045     Place sequential compression device  Until discontinued         10/26/23 0045                               Lori Long PA-C  Department of Hospital Medicine  [facility] ([facility])      "

## 2023-10-26 NOTE — ASSESSMENT & PLAN NOTE
U/A with 1+ leukocytes and 20 WBC on microscopic  Urine cultures pending  Continue IV levofloxacin

## 2023-10-26 NOTE — ED NOTES
Per Rosalio RN at [facility], Patient was swabbed yesterday and positive for COVID. Per documentation at [facility], Patient is a DNR. MD Gregory notified.

## 2023-10-26 NOTE — ASSESSMENT & PLAN NOTE
Patient with acute kidney injury/acute renal failure likely due to pre-renal azotemia due to dehydration JACKELIN is currently stable. Baseline creatinine 1.0 - Labs reviewed- Renal function/electrolytes with Estimated Creatinine Clearance: 47.9 mL/min (based on SCr of 1.4 mg/dL). according to latest data. Monitor urine output and serial BMP and adjust therapy as needed. Avoid nephrotoxins and renally dose meds for GFR listed above.    IV fluids.  In the setting of JACKELIN/UTI CT AP ordered and no renal stones seen.

## 2023-10-26 NOTE — ASSESSMENT & PLAN NOTE
This patient does have evidence of infective focus  My overall impression is sepsis.  Source: Respiratory and Urinary Tract  Antibiotics given-   Antibiotics (72h ago, onward)    Start     Stop Route Frequency Ordered    10/27/23 1700  levoFLOXacin 750 mg/150 mL IVPB 750 mg         -- IV Every 48 hours (non-standard times) 10/26/23 0050    10/26/23 0900  mupirocin 2 % ointment         10/31/23 0859 Nasl 2 times daily 10/26/23 0046        Latest lactate reviewed-  Recent Labs   Lab 10/25/23  1804   LACTATE 0.7     Organ dysfunction indicated by Acute kidney injury and Acute heart failure    Fluid challenge Patient recevied 1 L bolus in the ED.  Will give IV fluids.  Pleural effusions seen on CTAP   Post- resuscitation assessment No - Post resuscitation assessment not needed       Will Not start Pressors- Levophed for MAP of 65  Source control achieved by: IV levofloxacin   Blood and urine culture pending

## 2023-10-27 PROBLEM — I27.20 PULMONARY HYPERTENSION: Status: ACTIVE | Noted: 2023-10-27

## 2023-10-27 LAB
ANION GAP SERPL CALC-SCNC: 9 MMOL/L (ref 8–16)
BACTERIA UR CULT: ABNORMAL
BASOPHILS # BLD AUTO: 0.01 K/UL (ref 0–0.2)
BASOPHILS NFR BLD: 0.3 % (ref 0–1.9)
BUN SERPL-MCNC: 20 MG/DL (ref 8–23)
CALCIUM SERPL-MCNC: 7.3 MG/DL (ref 8.7–10.5)
CHLORIDE SERPL-SCNC: 106 MMOL/L (ref 95–110)
CO2 SERPL-SCNC: 23 MMOL/L (ref 23–29)
CREAT SERPL-MCNC: 1.3 MG/DL (ref 0.5–1.4)
DIFFERENTIAL METHOD: ABNORMAL
EOSINOPHIL # BLD AUTO: 0 K/UL (ref 0–0.5)
EOSINOPHIL NFR BLD: 0.3 % (ref 0–8)
ERYTHROCYTE [DISTWIDTH] IN BLOOD BY AUTOMATED COUNT: 13.3 % (ref 11.5–14.5)
EST. GFR  (NO RACE VARIABLE): 54 ML/MIN/1.73 M^2
GLUCOSE SERPL-MCNC: 88 MG/DL (ref 70–110)
HCT VFR BLD AUTO: 28.7 % (ref 40–54)
HGB BLD-MCNC: 9.5 G/DL (ref 14–18)
IMM GRANULOCYTES # BLD AUTO: 0.01 K/UL (ref 0–0.04)
IMM GRANULOCYTES NFR BLD AUTO: 0.3 % (ref 0–0.5)
LYMPHOCYTES # BLD AUTO: 1.3 K/UL (ref 1–4.8)
LYMPHOCYTES NFR BLD: 44.3 % (ref 18–48)
MCH RBC QN AUTO: 35.7 PG (ref 27–31)
MCHC RBC AUTO-ENTMCNC: 33.1 G/DL (ref 32–36)
MCV RBC AUTO: 108 FL (ref 82–98)
MONOCYTES # BLD AUTO: 0.4 K/UL (ref 0.3–1)
MONOCYTES NFR BLD: 13.9 % (ref 4–15)
NEUTROPHILS # BLD AUTO: 1.2 K/UL (ref 1.8–7.7)
NEUTROPHILS NFR BLD: 40.9 % (ref 38–73)
NRBC BLD-RTO: 0 /100 WBC
PLATELET # BLD AUTO: 116 K/UL (ref 150–450)
PMV BLD AUTO: 9.5 FL (ref 9.2–12.9)
POCT GLUCOSE: 109 MG/DL (ref 70–110)
POCT GLUCOSE: 130 MG/DL (ref 70–110)
POCT GLUCOSE: 138 MG/DL (ref 70–110)
POCT GLUCOSE: 179 MG/DL (ref 70–110)
POTASSIUM SERPL-SCNC: 3.6 MMOL/L (ref 3.5–5.1)
RBC # BLD AUTO: 2.66 M/UL (ref 4.6–6.2)
SODIUM SERPL-SCNC: 138 MMOL/L (ref 136–145)
WBC # BLD AUTO: 2.87 K/UL (ref 3.9–12.7)

## 2023-10-27 PROCEDURE — 11000001 HC ACUTE MED/SURG PRIVATE ROOM

## 2023-10-27 PROCEDURE — 97162 PT EVAL MOD COMPLEX 30 MIN: CPT

## 2023-10-27 PROCEDURE — 93005 ELECTROCARDIOGRAM TRACING: CPT

## 2023-10-27 PROCEDURE — 99900035 HC TECH TIME PER 15 MIN (STAT)

## 2023-10-27 PROCEDURE — 93010 EKG 12-LEAD: ICD-10-PCS | Mod: ,,, | Performed by: INTERNAL MEDICINE

## 2023-10-27 PROCEDURE — 36415 COLL VENOUS BLD VENIPUNCTURE: CPT | Performed by: PHYSICIAN ASSISTANT

## 2023-10-27 PROCEDURE — 63600175 PHARM REV CODE 636 W HCPCS: Performed by: PHYSICIAN ASSISTANT

## 2023-10-27 PROCEDURE — 94761 N-INVAS EAR/PLS OXIMETRY MLT: CPT

## 2023-10-27 PROCEDURE — 25000003 PHARM REV CODE 250: Performed by: FAMILY MEDICINE

## 2023-10-27 PROCEDURE — 99233 PR SUBSEQUENT HOSPITAL CARE,LEVL III: ICD-10-PCS | Mod: ,,, | Performed by: PHYSICIAN ASSISTANT

## 2023-10-27 PROCEDURE — 27000207 HC ISOLATION

## 2023-10-27 PROCEDURE — 99233 SBSQ HOSP IP/OBS HIGH 50: CPT | Mod: ,,, | Performed by: PHYSICIAN ASSISTANT

## 2023-10-27 PROCEDURE — 97530 THERAPEUTIC ACTIVITIES: CPT

## 2023-10-27 PROCEDURE — 93010 ELECTROCARDIOGRAM REPORT: CPT | Mod: ,,, | Performed by: INTERNAL MEDICINE

## 2023-10-27 PROCEDURE — 85025 COMPLETE CBC W/AUTO DIFF WBC: CPT | Performed by: PHYSICIAN ASSISTANT

## 2023-10-27 PROCEDURE — 80048 BASIC METABOLIC PNL TOTAL CA: CPT | Performed by: PHYSICIAN ASSISTANT

## 2023-10-27 PROCEDURE — 63600175 PHARM REV CODE 636 W HCPCS: Performed by: FAMILY MEDICINE

## 2023-10-27 PROCEDURE — 25000003 PHARM REV CODE 250: Performed by: PHYSICIAN ASSISTANT

## 2023-10-27 RX ORDER — ONDANSETRON 2 MG/ML
4 INJECTION INTRAMUSCULAR; INTRAVENOUS EVERY 8 HOURS PRN
Status: DISCONTINUED | OUTPATIENT
Start: 2023-10-27 | End: 2023-10-27

## 2023-10-27 RX ORDER — LEVOFLOXACIN 5 MG/ML
750 INJECTION, SOLUTION INTRAVENOUS
Status: DISCONTINUED | OUTPATIENT
Start: 2023-10-27 | End: 2023-10-30

## 2023-10-27 RX ORDER — PROCHLORPERAZINE MALEATE 5 MG
5 TABLET ORAL 3 TIMES DAILY PRN
Status: DISCONTINUED | OUTPATIENT
Start: 2023-10-27 | End: 2023-10-31 | Stop reason: HOSPADM

## 2023-10-27 RX ADMIN — LEVOFLOXACIN 750 MG: 750 INJECTION, SOLUTION INTRAVENOUS at 11:10

## 2023-10-27 RX ADMIN — ATORVASTATIN CALCIUM 40 MG: 40 TABLET, FILM COATED ORAL at 08:10

## 2023-10-27 RX ADMIN — HYDROCODONE BITARTRATE AND ACETAMINOPHEN 1 TABLET: 10; 325 TABLET ORAL at 11:10

## 2023-10-27 RX ADMIN — APIXABAN 5 MG: 5 TABLET, FILM COATED ORAL at 08:10

## 2023-10-27 RX ADMIN — HYDROCODONE BITARTRATE AND ACETAMINOPHEN 1 TABLET: 10; 325 TABLET ORAL at 04:10

## 2023-10-27 RX ADMIN — HYDROCODONE BITARTRATE AND ACETAMINOPHEN 1 TABLET: 10; 325 TABLET ORAL at 06:10

## 2023-10-27 RX ADMIN — MUPIROCIN: 20 OINTMENT TOPICAL at 08:10

## 2023-10-27 RX ADMIN — SODIUM CHLORIDE: 9 INJECTION, SOLUTION INTRAVENOUS at 06:10

## 2023-10-27 RX ADMIN — PANTOPRAZOLE SODIUM 40 MG: 40 TABLET, DELAYED RELEASE ORAL at 08:10

## 2023-10-27 RX ADMIN — PROCHLORPERAZINE MALEATE 5 MG: 5 TABLET ORAL at 08:10

## 2023-10-27 RX ADMIN — SODIUM CHLORIDE: 9 INJECTION, SOLUTION INTRAVENOUS at 05:10

## 2023-10-27 RX ADMIN — GABAPENTIN 100 MG: 100 CAPSULE ORAL at 08:10

## 2023-10-27 NOTE — ASSESSMENT & PLAN NOTE
Patient with acute kidney injury/acute renal failure likely due to pre-renal azotemia due to dehydration JACKELIN is currently stable. Baseline creatinine 1.0 - Labs reviewed- Renal function/electrolytes with Estimated Creatinine Clearance: 51.6 mL/min (based on SCr of 1.3 mg/dL). according to latest data. Monitor urine output and serial BMP and adjust therapy as needed. Avoid nephrotoxins and renally dose meds for GFR listed above.    IV fluids.  In the setting of JACKELIN/UTI CT AP ordered and no renal stones seen.      Slowly improving

## 2023-10-27 NOTE — PLAN OF CARE
Problem: Infection  Goal: Absence of Infection Signs and Symptoms  Outcome: Ongoing, Progressing     Problem: Adult Inpatient Plan of Care  Goal: Plan of Care Review  Outcome: Ongoing, Progressing  Goal: Patient-Specific Goal (Individualized)  Outcome: Ongoing, Progressing  Goal: Absence of Hospital-Acquired Illness or Injury  Outcome: Ongoing, Progressing  Goal: Optimal Comfort and Wellbeing  Outcome: Ongoing, Progressing  Goal: Readiness for Transition of Care  Outcome: Ongoing, Progressing     Problem: Diabetes Comorbidity  Goal: Blood Glucose Level Within Targeted Range  Outcome: Ongoing, Progressing     Problem: Impaired Wound Healing  Goal: Optimal Wound Healing  Outcome: Ongoing, Progressing     Problem: Fall Injury Risk  Goal: Absence of Fall and Fall-Related Injury  Outcome: Ongoing, Progressing     Problem: Skin Injury Risk Increased  Goal: Skin Health and Integrity  Outcome: Ongoing, Progressing     Problem: UTI (Urinary Tract Infection)  Goal: Improved Infection Symptoms  Outcome: Ongoing, Progressing     Problem: Fluid and Electrolyte Imbalance (Acute Kidney Injury/Impairment)  Goal: Fluid and Electrolyte Balance  Outcome: Ongoing, Progressing     Problem: Oral Intake Inadequate (Acute Kidney Injury/Impairment)  Goal: Optimal Nutrition Intake  Outcome: Ongoing, Progressing     Problem: Renal Function Impairment (Acute Kidney Injury/Impairment)  Goal: Effective Renal Function  Outcome: Ongoing, Progressing

## 2023-10-27 NOTE — ASSESSMENT & PLAN NOTE
BNP mildly elevated   Seen on CTAP moderate left and small right pleural effusion with adjacent atelectasis   Gentle hydration but at high risk for increasing pulmonary edema/pleural effusions

## 2023-10-27 NOTE — ASSESSMENT & PLAN NOTE
U/A with 1+ leukocytes and 20 WBC on microscopic  Urine cultures gram negative rods   Continue IV levofloxacin

## 2023-10-27 NOTE — PROGRESS NOTES
[facility] Surg ([facility])  [facility] Medicine  Progress Note    Patient Name: Debbie Zepeda  MRN: [id number]  Patient Class: IP- Inpatient   Admission Date: 10/25/2023  Length of Stay: 2 days  Attending Physician: Barron Garcia MD  Primary Care Provider: Nigel Webb MD        Subjective:     Principal Problem:Sepsis due to gram-negative UTI        HPI:  Patient is an 88 year old male with medical history of HTN, HLD, PAF, CAD, HF EF45%, DM type 2, CKD stage 3 and prior stroke who was admitted to ED for urosepsis.  He was sent from nursing home with weakness, fever and hypoxia.  Patient denies any acute complaints such as cough, CP, SOB, nausea, vomiting dysuria and urinary retention.  Additionally there has been an outbreak of covid at NH and patient tested positive.        Admitted for urosepsis.  H/o neurogenic bladder and urinary retention.        Overview/Hospital Course:  PT admitted yesterday for urosepsis.  BP on lower side.  Gentle hydration.  JACKELIN mildly improving.  Will continue to monitor fluid status.  He is at high risk for fluid overload due to pulmonary HTN.  Additionally pleural effusions seen on CXR.      Urine culture gram negative rods.  Continue levofloxacin.        Past Medical History:   Diagnosis Date    Acute DVT (deep venous thrombosis) 2/17/2019    LLE    Cancer 1997    colon cancer    Diabetes mellitus     Hypercholesteremia     Hypertension     Pacemaker 2/17/2019       Past Surgical History:   Procedure Laterality Date    CHOLECYSTECTOMY      INCISION AND DRAINAGE OF HAND Right 10/6/2023    Procedure: INCISION AND DRAINAGE, HAND, IRRIGATION AND DRAINAGE;  Surgeon: Peter Silvestre MD;  Location: [facility] OR;  Service: Orthopedics;  Laterality: Right;  hand  local requested per the family  7am per Mahsa    INSERTION OF PACEMAKER N/A 1/14/2019    Procedure: INSERTION, PACEMAKER;  Surgeon: Sabino Galicia MD;  Location: [facility] CATH;  Service: Cardiology;  Laterality:  N/A;       Review of patient's allergies indicates:  No Known Allergies    No current facility-administered medications on file prior to encounter.     Current Outpatient Medications on File Prior to Encounter   Medication Sig    amLODIPine (NORVASC) 5 MG tablet Take 5 mg by mouth once daily.    apixaban (ELIQUIS) 5 mg Tab Take 1 tablet (5 mg total) by mouth 2 (two) times daily.    ascorbic acid, vitamin C, (VITAMIN C) 500 MG tablet Take 500 mg by mouth once daily.    atorvastatin (LIPITOR) 40 MG tablet Take 40 mg by mouth every evening.    furosemide (LASIX) 20 MG tablet Take 20 mg by mouth once daily.    gabapentin (NEURONTIN) 100 MG capsule Take 1 capsule (100 mg total) by mouth every evening.    HYDROcodone-acetaminophen (NORCO)  mg per tablet Take 1 tablet by mouth every 6 (six) hours as needed for Pain.    multivitamin (ONE DAILY MULTIVITAMIN) per tablet Take 1 tablet by mouth once daily.    omeprazole (PRILOSEC) 20 MG capsule Take 20 mg by mouth once daily.    ondansetron (ZOFRAN) 4 MG tablet Take 4 mg by mouth every 8 (eight) hours as needed for Nausea.    potassium chloride (KLOR-CON) 10 MEQ TbSR Take 1 tablet (10 mEq total) by mouth every other day.    thiamine 100 MG tablet Take 100 mg by mouth once daily.    vitamin D (VITAMIN D3) 1000 units Tab Take 1,000 Units by mouth once daily.    zinc gluconate 50 mg tablet Take 50 mg by mouth once daily.    acetaminophen (TYLENOL) 650 MG Supp Place 1 suppository (650 mg total) rectally every 4 (four) hours as needed.    [] L.acidophil,parac-S.therm-Bif. (RISAQUAD) Cap capsule Take 1 capsule by mouth once daily. for 14 days     Family History    None       Tobacco Use    Smoking status: Never    Smokeless tobacco: Never   Substance and Sexual Activity    Alcohol use: Yes     Comment: 2-3 Highball per night.    Drug use: No    Sexual activity: Not on file     Review of Systems   Constitutional:  Negative for chills and fever.    HENT:  Negative for ear discharge and ear pain.    Eyes:  Negative for pain and discharge.   Respiratory:  Negative for cough and shortness of breath.    Cardiovascular:  Negative for chest pain and leg swelling.   Gastrointestinal:  Negative for nausea and vomiting.   Endocrine: Negative for cold intolerance and heat intolerance.   Genitourinary:  Negative for difficulty urinating and dysuria.   Musculoskeletal:  Negative for joint swelling and myalgias.   Skin:  Negative for rash and wound.   Neurological:  Negative for dizziness and headaches.   Psychiatric/Behavioral:  Negative for agitation and confusion.      Objective:     Vital Signs (Most Recent):  Temp: 96.2 °F (35.7 °C) (10/27/23 1141)  Pulse: 60 (10/27/23 1200)  Resp: 16 (10/27/23 1141)  BP: (!) 97/50 (10/27/23 1143)  SpO2: 96 % (10/27/23 1141) Vital Signs (24h Range):  Temp:  [96.2 °F (35.7 °C)-99 °F (37.2 °C)] 96.2 °F (35.7 °C)  Pulse:  [59-65] 60  Resp:  [12-20] 16  SpO2:  [94 %-96 %] 96 %  BP: ()/(50-59) 97/50     Weight: 100.1 kg (220 lb 10.9 oz)  Body mass index is 28.33 kg/m².     Physical Exam  Constitutional:       General: He is not in acute distress.  HENT:      Head: Normocephalic and atraumatic.   Eyes:      General:         Right eye: No discharge.         Left eye: No discharge.   Cardiovascular:      Rate and Rhythm: Normal rate. Rhythm irregular.   Pulmonary:      Effort: Pulmonary effort is normal.      Breath sounds: Normal breath sounds.   Abdominal:      General: There is no distension.      Tenderness: There is no abdominal tenderness.   Musculoskeletal:         General: Swelling (RUE) present. No tenderness.      Cervical back: Neck supple. No tenderness.      Right lower leg: Edema (+1) present.      Left lower leg: Edema (+1) present.   Skin:     General: Skin is warm and dry.   Neurological:      General: No focal deficit present.      Mental Status: He is alert and oriented to person, place, and time.      Comments:  "Patient's speech is delayed but eventually answers.     Psychiatric:         Mood and Affect: Mood normal.         Behavior: Behavior normal.                Significant Labs: A1C:   Recent Labs   Lab 10/02/23  1455   HGBA1C 5.4       ABGs: No results for input(s): "PH", "PCO2", "HCO3", "POCSATURATED", "BE", "TOTALHB", "COHB", "METHB", "O2HB", "POCFIO2", "PO2" in the last 48 hours.  Bilirubin:   Recent Labs   Lab 10/02/23  1430 10/03/23  0536 10/25/23  1524 10/26/23  1305   BILITOT 3.9* 2.5* 0.7 0.6       Blood Culture:   Recent Labs   Lab 10/25/23  1804 10/25/23  1814   LABBLOO No Growth to date  No Growth to date No Growth to date  No Growth to date       BMP:   Recent Labs   Lab 10/27/23  0546   GLU 88      K 3.6      CO2 23   BUN 20   CREATININE 1.3   CALCIUM 7.3*       CBC:   Recent Labs   Lab 10/25/23  1525 10/26/23  1305 10/27/23  0546   WBC 4.94 3.62* 2.87*   HGB 10.4* 9.8* 9.5*   HCT 30.9* 28.9* 28.7*    148* 116*       CMP:   Recent Labs   Lab 10/25/23  1524 10/26/23  1305 10/27/23  0546   * 135* 138   K 4.0 3.9 3.6    103 106   CO2 22* 21* 23   GLU 93 97 88   BUN 16 20 20   CREATININE 1.4 1.3 1.3   CALCIUM 7.9* 7.7* 7.3*   PROT 6.0 5.6*  --    ALBUMIN 2.3* 2.1*  --    BILITOT 0.7 0.6  --    ALKPHOS 90 90  --    AST 35 37  --    ALT 20 19  --    ANIONGAP 10 11 9       Cardiac Markers:   Recent Labs   Lab 10/25/23  1524   *       Coagulation: No results for input(s): "PT", "INR", "APTT" in the last 48 hours.  Lactic Acid:   Recent Labs   Lab 10/25/23  1804   LACTATE 0.7       Lipase: No results for input(s): "LIPASE" in the last 48 hours.  Lipid Panel: No results for input(s): "CHOL", "HDL", "LDLCALC", "TRIG", "CHOLHDL" in the last 48 hours.  Magnesium: No results for input(s): "MG" in the last 48 hours.  POCT Glucose:   Recent Labs   Lab 10/26/23  1948 10/27/23  0725 10/27/23  1141   POCTGLUCOSE 142* 179* 130*     Prealbumin: No results for input(s): "PREALBUMIN" in " "the last 48 hours.  Respiratory Culture: No results for input(s): "GSRESP", "RESPIRATORYC" in the last 48 hours.  Troponin:   Recent Labs   Lab 10/25/23  1804 10/25/23  2058   TROPONINI 0.050* 0.087*       TSH: No results for input(s): "TSH" in the last 4320 hours.  Urine Culture:   Recent Labs   Lab 10/25/23  1735   LABURIN GRAM NEGATIVE SCOTT  >100,000 cfu/ml  Identification and susceptibility pending  *     Urine Studies:   Recent Labs   Lab 10/25/23  1541   COLORU Yellow   APPEARANCEUA Hazy*   PHUR 5.0   SPECGRAV 1.015   PROTEINUA Negative   GLUCUA Negative   KETONESU Negative   BILIRUBINUA Negative   OCCULTUA 1+*   NITRITE Positive*   UROBILINOGEN Negative   LEUKOCYTESUR 1+*   RBCUA 4   WBCUA 20*   BACTERIA Many*         Significant Imaging: I have reviewed all pertinent imaging results/findings within the past 24 hours.      Assessment/Plan:      * Sepsis due to gram-negative UTI  This patient does have evidence of infective focus  My overall impression is sepsis.  Source: Respiratory and Urinary Tract  Antibiotics given-   Antibiotics (72h ago, onward)    Start     Stop Route Frequency Ordered    10/27/23 1100  levoFLOXacin 750 mg/150 mL IVPB 750 mg         -- IV Every 24 hours (non-standard times) 10/27/23 0957    10/26/23 0900  mupirocin 2 % ointment         10/31/23 0859 Nasl 2 times daily 10/26/23 0046        Latest lactate reviewed-  Recent Labs   Lab 10/25/23  1804   LACTATE 0.7     Organ dysfunction indicated by Acute kidney injury and Acute heart failure    Fluid challenge Patient recevied 1 L bolus in the ED.  Will give IV fluids.  Pleural effusions seen on CTAP   Post- resuscitation assessment No - Post resuscitation assessment not needed       Will Not start Pressors- Levophed for MAP of 65  Source control achieved by: IV levofloxacin   Blood CX pending and urine culture gram negative rods         Pulmonary hypertension  Seen on Echo   Moderate PHTN   Pulmonary effusion     Complicated urinary tract " infection  U/A with 1+ leukocytes and 20 WBC on microscopic  Urine cultures gram negative rods   Continue IV levofloxacin       Pleural effusion  BNP mildly elevated   Seen on CTAP moderate left and small right pleural effusion with adjacent atelectasis   Gentle hydration but at high risk for increasing pulmonary edema/pleural effusions      COVID-19  Patient is identified as positive for COVID   Initiate standard COVID protocols; COVID-19 testing Collection Date: 10/12/2023 Collection Time:  12:01 PM ,Infection Control notification  and isolation- respiratory, contact and droplet per protocol    Diagnostics: daily labs     Management: none at this time.  Pt is not hypoxic     Advance Care Planning  Patient is a DNR.  Lapost in chart     Acute kidney injury superimposed on chronic kidney disease  Patient with acute kidney injury/acute renal failure likely due to pre-renal azotemia due to dehydration JACKELIN is currently stable. Baseline creatinine 1.0 - Labs reviewed- Renal function/electrolytes with Estimated Creatinine Clearance: 51.6 mL/min (based on SCr of 1.3 mg/dL). according to latest data. Monitor urine output and serial BMP and adjust therapy as needed. Avoid nephrotoxins and renally dose meds for GFR listed above.    IV fluids.  In the setting of JACKELIN/UTI CT AP ordered and no renal stones seen.      Slowly improving     Atrial fibrillation  Rate controlled  Continue eliquis     Acute pulmonary embolism  Continue home Eliquis       Type 2 diabetes mellitus with circulatory disorder  Patient's FSGs are controlled on current medication regimen.  Last A1c reviewed-   Lab Results   Component Value Date    HGBA1C 5.4 10/02/2023     Most recent fingerstick glucose reviewed-   Recent Labs   Lab 10/26/23  1643 10/26/23  1948 10/27/23  0725 10/27/23  1141   POCTGLUCOSE 98 142* 179* 130*     Current correctional scale  Low  Maintain anti-hyperglycemic dose as follows-   Antihyperglycemics (From admission, onward)    Start      Stop Route Frequency Ordered    10/26/23 1315  insulin aspart U-100 pen 0-5 Units         -- SubQ Before meals & nightly PRN 10/26/23 1216        Hold Oral hypoglycemics while patient is in the hospital.      VTE Risk Mitigation (From admission, onward)         Ordered     apixaban tablet 5 mg  2 times daily         10/26/23 0859     IP VTE HIGH RISK PATIENT  Once         10/26/23 0045     Place sequential compression device  Until discontinued         10/26/23 0045                Discharge Planning   EDMOND:      Code Status: DNR   Is the patient medically ready for discharge?:     Reason for patient still in hospital (select all that apply): Patient trending condition  Discharge Plan A: Skilled Nursing Facility                  Lori Long PA-C  Department of Hospital Medicine   [place] [facility] Surg (3rd Fl)

## 2023-10-27 NOTE — PROGRESS NOTES
Pharmacist Renal Dose Adjustment Note    Debbie Zepeda is a 88 y.o. male being treated with the medication LEVAQUIN    Patient Data:    Vital Signs (Most Recent):  Temp: 98.1 °F (36.7 °C) (10/27/23 0847)  Pulse: 63 (10/27/23 0956)  Resp: 12 (10/27/23 0847)  BP: (!) 101/52 (10/27/23 0847)  SpO2: (!) 94 % (10/27/23 0847) Vital Signs (72h Range):  Temp:  [96.3 °F (35.7 °C)-100.8 °F (38.2 °C)]   Pulse:  [59-90]   Resp:  [12-39]   BP: ()/(51-67)   SpO2:  [93 %-100 %]      Recent Labs   Lab 10/25/23  1524 10/26/23  1305 10/27/23  0546   CREATININE 1.4 1.3 1.3     Serum creatinine: 1.3 mg/dL 10/27/23 0546  Estimated creatinine clearance: 51.6 mL/min    Medication: LEVAQUIN dose:  750MG  frequency  Q48H  will be changed to medication: LEVAQUIN  dose: 750MG  frequency: Q24H    Pharmacist's Name: Cipriano MalloyD  Pharmacist's Extension: [phone]

## 2023-10-27 NOTE — SUBJECTIVE & OBJECTIVE
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       Past Surgical History:   Procedure Laterality Date    CHOLECYSTECTOMY      INCISION AND DRAINAGE OF HAND Right 10/6/2023    Procedure: INCISION AND DRAINAGE, HAND, IRRIGATION AND DRAINAGE;  Surgeon: Peter Silvestre MD;  Location: [facility] OR;  Service: Orthopedics;  Laterality: Right;  hand  local requested per the family  7am per Mahsa    INSERTION OF PACEMAKER N/A 1/14/2019    Procedure: INSERTION, PACEMAKER;  Surgeon: Sabino Galicia MD;  Location: [facility] CATH;  Service: Cardiology;  Laterality: N/A;       Review of patient's allergies indicates:  No Known Allergies    No current facility-administered medications on file prior to encounter.     Current Outpatient Medications on File Prior to Encounter   Medication Sig    amLODIPine (NORVASC) 5 MG tablet Take 5 mg by mouth once daily.    apixaban (ELIQUIS) 5 mg Tab Take 1 tablet (5 mg total) by mouth 2 (two) times daily.    ascorbic acid, vitamin C, (VITAMIN C) 500 MG tablet Take 500 mg by mouth once daily.    atorvastatin (LIPITOR) 40 MG tablet Take 40 mg by mouth every evening.    furosemide (LASIX) 20 MG tablet Take 20 mg by mouth once daily.    gabapentin (NEURONTIN) 100 MG capsule Take 1 capsule (100 mg total) by mouth every evening.    HYDROcodone-acetaminophen (NORCO)  mg per tablet Take 1 tablet by mouth every 6 (six) hours as needed for Pain.    multivitamin (ONE DAILY MULTIVITAMIN) per tablet Take 1 tablet by mouth once daily.    omeprazole (PRILOSEC) 20 MG capsule Take 20 mg by mouth once daily.    ondansetron (ZOFRAN) 4 MG tablet Take 4 mg by mouth every 8 (eight) hours as needed for Nausea.    potassium chloride (KLOR-CON) 10 MEQ TbSR Take 1 tablet (10 mEq total) by mouth every other day.    thiamine 100 MG tablet Take 100 mg by  mouth once daily.    vitamin D (VITAMIN D3) 1000 units Tab Take 1,000 Units by mouth once daily.    zinc gluconate 50 mg tablet Take 50 mg by mouth once daily.    acetaminophen (TYLENOL) 650 MG Supp Place 1 suppository (650 mg total) rectally every 4 (four) hours as needed.    [] L.acidophil,parac-S.therm-Bif. (RISAQUAD) Cap capsule Take 1 capsule by mouth once daily. for 14 days     Family History    None       Tobacco Use    Smoking status: Never    Smokeless tobacco: Never   Substance and Sexual Activity    Alcohol use: Yes     Comment: 2-3 [place] per night.    Drug use: No    Sexual activity: Not on file     Review of Systems   Constitutional:  Negative for chills and fever.   HENT:  Negative for ear discharge and ear pain.    Eyes:  Negative for pain and discharge.   Respiratory:  Negative for cough and shortness of breath.    Cardiovascular:  Negative for chest pain and leg swelling.   Gastrointestinal:  Negative for nausea and vomiting.   Endocrine: Negative for cold intolerance and heat intolerance.   Genitourinary:  Negative for difficulty urinating and dysuria.   Musculoskeletal:  Negative for joint swelling and myalgias.   Skin:  Negative for rash and wound.   Neurological:  Negative for dizziness and headaches.   Psychiatric/Behavioral:  Negative for agitation and confusion.      Objective:     Vital Signs (Most Recent):  Temp: 96.2 °F (35.7 °C) (10/27/23 1141)  Pulse: 60 (10/27/23 1200)  Resp: 16 (10/27/23 1141)  BP: (!) 97/50 (10/27/23 1143)  SpO2: 96 % (10/27/23 1141) Vital Signs (24h Range):  Temp:  [96.2 °F (35.7 °C)-99 °F (37.2 °C)] 96.2 °F (35.7 °C)  Pulse:  [59-65] 60  Resp:  [12-20] 16  SpO2:  [94 %-96 %] 96 %  BP: ()/(50-59) 97/50     Weight: 100.1 kg (220 lb 10.9 oz)  Body mass index is 28.33 kg/m².     Physical Exam  Constitutional:       General: He is not in acute distress.  HENT:      Head: Normocephalic and atraumatic.   Eyes:      General:         Right eye: No discharge.   "       Left eye: No discharge.   Cardiovascular:      Rate and Rhythm: Normal rate. Rhythm irregular.   Pulmonary:      Effort: Pulmonary effort is normal.      Breath sounds: Normal breath sounds.   Abdominal:      General: There is no distension.      Tenderness: There is no abdominal tenderness.   Musculoskeletal:         General: Swelling (RUE) present. No tenderness.      Cervical back: Neck supple. No tenderness.      Right lower leg: Edema (+1) present.      Left lower leg: Edema (+1) present.   Skin:     General: Skin is warm and dry.   Neurological:      General: No focal deficit present.      Mental Status: He is alert and oriented to person, place, and time.      Comments: Patient's speech is delayed but eventually answers.     Psychiatric:         Mood and Affect: Mood normal.         Behavior: Behavior normal.                Significant Labs: A1C:   Recent Labs   Lab 10/02/23  1455   HGBA1C 5.4       ABGs: No results for input(s): "PH", "PCO2", "HCO3", "POCSATURATED", "BE", "TOTALHB", "COHB", "METHB", "O2HB", "POCFIO2", "PO2" in the last 48 hours.  Bilirubin:   Recent Labs   Lab 10/02/23  1430 10/03/23  0536 10/25/23  1524 10/26/23  1305   BILITOT 3.9* 2.5* 0.7 0.6       Blood Culture:   Recent Labs   Lab 10/25/23  1804 10/25/23  1814   LABBLOO No Growth to date  No Growth to date No Growth to date  No Growth to date       BMP:   Recent Labs   Lab 10/27/23  0546   GLU 88      K 3.6      CO2 23   BUN 20   CREATININE 1.3   CALCIUM 7.3*       CBC:   Recent Labs   Lab 10/25/23  1525 10/26/23  1305 10/27/23  0546   WBC 4.94 3.62* 2.87*   HGB 10.4* 9.8* 9.5*   HCT 30.9* 28.9* 28.7*    148* 116*       CMP:   Recent Labs   Lab 10/25/23  1524 10/26/23  1305 10/27/23  0546   * 135* 138   K 4.0 3.9 3.6    103 106   CO2 22* 21* 23   GLU 93 97 88   BUN 16 20 20   CREATININE 1.4 1.3 1.3   CALCIUM 7.9* 7.7* 7.3*   PROT 6.0 5.6*  --    ALBUMIN 2.3* 2.1*  --    BILITOT 0.7 0.6  --  " "  ALKPHOS 90 90  --    AST 35 37  --    ALT 20 19  --    ANIONGAP 10 11 9       Cardiac Markers:   Recent Labs   Lab 10/25/23  1524   *       Coagulation: No results for input(s): "PT", "INR", "APTT" in the last 48 hours.  Lactic Acid:   Recent Labs   Lab 10/25/23  1804   LACTATE 0.7       Lipase: No results for input(s): "LIPASE" in the last 48 hours.  Lipid Panel: No results for input(s): "CHOL", "HDL", "LDLCALC", "TRIG", "CHOLHDL" in the last 48 hours.  Magnesium: No results for input(s): "MG" in the last 48 hours.  POCT Glucose:   Recent Labs   Lab 10/26/23  1948 10/27/23  0725 10/27/23  1141   POCTGLUCOSE 142* 179* 130*     Prealbumin: No results for input(s): "PREALBUMIN" in the last 48 hours.  Respiratory Culture: No results for input(s): "GSRESP", "RESPIRATORYC" in the last 48 hours.  Troponin:   Recent Labs   Lab 10/25/23  1804 10/25/23  2058   TROPONINI 0.050* 0.087*       TSH: No results for input(s): "TSH" in the last 4320 hours.  Urine Culture:   Recent Labs   Lab 10/25/23  1735   LABURIN GRAM NEGATIVE SCOTT  >100,000 cfu/ml  Identification and susceptibility pending  *     Urine Studies:   Recent Labs   Lab 10/25/23  1541   COLORU Yellow   APPEARANCEUA Hazy*   PHUR 5.0   SPECGRAV 1.015   PROTEINUA Negative   GLUCUA Negative   KETONESU Negative   BILIRUBINUA Negative   OCCULTUA 1+*   NITRITE Positive*   UROBILINOGEN Negative   LEUKOCYTESUR 1+*   RBCUA 4   WBCUA 20*   BACTERIA Many*         Significant Imaging: I have reviewed all pertinent imaging results/findings within the past 24 hours.  "

## 2023-10-27 NOTE — PT/OT/SLP EVAL
Physical Therapy Evaluation    Patient Name:  Debbie Zepeda   MRN:  [id number]    Recommendations:     Discharge Recommendations: Moderate Intensity Therapy (SNF)   Discharge Equipment Recommendations: other (see comments) (NH will provide)   Barriers to discharge: None and [facility] resident    Assessment:     Debbie Zepeda is a 88 y.o. male admitted with a medical diagnosis of Sepsis due to gram-negative UTI.  He presents with the following impairments/functional limitations: weakness, impaired endurance, impaired self care skills, impaired functional mobility, gait instability, impaired balance, decreased upper extremity function, decreased lower extremity function, impaired cognition, decreased coordination, pain, decreased safety awareness, edema, impaired skin, decreased ROM. Patient tolerated sitting up at edge of the bed, requires max assistance in sit to stand using RW, stooping forward posture and unable to take a step due to weakness. Maintained SPO2 at 96% - 98%. No sign of respiratory distress.    Rehab Prognosis: Fair; patient would benefit from acute skilled PT services to address these deficits and reach maximum level of function.    Recent Surgery: * No surgery found *      Plan:     During this hospitalization, patient to be seen 6 x/week to address the identified rehab impairments via gait training, therapeutic activities, therapeutic exercises and progress toward the following goals:    Plan of Care Expires:  11/10/23    Subjective     Chief Complaint: weakness  Patient/Family Comments/goals: none stated  Pain/Comfort:  Pain Rating 1: 8/10  Location - Side 1: Right  Location - Orientation 1: generalized  Location 1: hand  Pain Addressed 1: Cessation of Activity, Reposition    Patients cultural, spiritual, Confucianism conflicts given the current situation: no    Living Environment:  [facility] resident  Prior to admission, patients level of function was using wheelchair for locomotion.   Equipment used at home: hospital bed, wheelchair, walker, rolling.  DME owned (not currently used): none.  Upon discharge, patient will have assistance from NH staffs.    Objective:     Communicated with patient prior to session.  Patient found HOB elevated with lugo catheter, bed alarm, peripheral IV, SCD, telemetry  upon PT entry to room.    General Precautions: Standard, fall  Orthopedic Precautions:N/A   Braces: N/A  Respiratory Status: Room air    Exams:  Cognitive Exam:  Patient is oriented to Person and appears having confusion. Poor historian  Fine Motor Coordination:    -       Intact  Gross Motor Coordination:  WFL  Postural Exam:  Patient presented with the following abnormalities:    -       Rounded shoulders  -       Forward head  -       Kyphosis  Skin Integrity/Edema:      -       pitting edema on right UE; wound on right hand covered with dressing  -       Skin integrity: Visible skin intact  RLE ROM: WFL  RLE Strength: 3-/5  LLE ROM: WFL  LLE Strength: 3-/5    Functional Mobility:  Bed Mobility:     Rolling Left:  moderate assistance  Rolling Right: moderate assistance  Scooting: moderate assistance  Supine to Sit: maximal assistance  Sit to Supine: maximal assistance  Transfers:     Sit to Stand:  maximal assistance with rolling walker  Gait: unable      AM-PAC 6 CLICK MOBILITY  Total Score:10       Treatment & Education:  PT eval. Educated patient the role of PT. Initiated bed mobility, sitting activity at side of the bed and static stand tolerance using RW.     Patient left HOB elevated with all lines intact, call button in reach, bed alarm on, and nursing notified.    GOALS:   Patient will increase functional independence with mobility by performin. Rolling to sides with supervision  2. Supine <> sit with Contact Guard Assistance  3. Sit <>Stand with Minimal Assistance using RW  4. Bed to chair transfer with Minimal Assistance with or without rolling walker using Stand Pivot TECHNIQUE  5.  Gait  x ~25  feet with Moderate Assistance with or without rolling walker  6. Lower extremity exercise program x10 reps per handout, with assistance as needed         History:     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       Past Surgical History:   Procedure Laterality Date    CHOLECYSTECTOMY      INCISION AND DRAINAGE OF HAND Right 10/6/2023    Procedure: INCISION AND DRAINAGE, HAND, IRRIGATION AND DRAINAGE;  Surgeon: Peter Silvestre MD;  Location: [facility] OR;  Service: Orthopedics;  Laterality: Right;  hand  local requested per the family  7am per Mahsa    INSERTION OF PACEMAKER N/A 1/14/2019    Procedure: INSERTION, PACEMAKER;  Surgeon: Sabino Galicia MD;  Location: [facility] CATH;  Service: Cardiology;  Laterality: N/A;       Time Tracking:     PT Received On: 10/27/23  PT Start Time: 1040     PT Stop Time: 1105  PT Total Time (min): 25 min     Billable Minutes: Evaluation 15 and Therapeutic Activity 10      10/27/2023

## 2023-10-27 NOTE — ASSESSMENT & PLAN NOTE
This patient does have evidence of infective focus  My overall impression is sepsis.  Source: Respiratory and Urinary Tract  Antibiotics given-   Antibiotics (72h ago, onward)    Start     Stop Route Frequency Ordered    10/27/23 1100  levoFLOXacin 750 mg/150 mL IVPB 750 mg         -- IV Every 24 hours (non-standard times) 10/27/23 0957    10/26/23 0900  mupirocin 2 % ointment         10/31/23 0859 Nasl 2 times daily 10/26/23 0046        Latest lactate reviewed-  Recent Labs   Lab 10/25/23  1804   LACTATE 0.7     Organ dysfunction indicated by Acute kidney injury and Acute heart failure    Fluid challenge Patient recevied 1 L bolus in the ED.  Will give IV fluids.  Pleural effusions seen on CTAP   Post- resuscitation assessment No - Post resuscitation assessment not needed       Will Not start Pressors- Levophed for MAP of 65  Source control achieved by: IV levofloxacin   Blood CX pending and urine culture gram negative rods

## 2023-10-27 NOTE — ASSESSMENT & PLAN NOTE
Patient's FSGs are controlled on current medication regimen.  Last A1c reviewed-   Lab Results   Component Value Date    HGBA1C 5.4 10/02/2023     Most recent fingerstick glucose reviewed-   Recent Labs   Lab 10/26/23  1643 10/26/23  1948 10/27/23  0725 10/27/23  1141   POCTGLUCOSE 98 142* 179* 130*     Current correctional scale  Low  Maintain anti-hyperglycemic dose as follows-   Antihyperglycemics (From admission, onward)    Start     Stop Route Frequency Ordered    10/26/23 1315  insulin aspart U-100 pen 0-5 Units         -- SubQ Before meals & nightly PRN 10/26/23 1216        Hold Oral hypoglycemics while patient is in the hospital.

## 2023-10-27 NOTE — PT/OT/SLP PROGRESS
"Occupational Therapy      Patient Name:  Debbie Zepeda   MRN:  [id number]    Patient not seen today secondary to Patient unwilling to participate. Upon entry Pt reporting increased fatigue following PT session today. Pt stating "I just finished standing and walking sideways, he wore me out. I'm not doing it today." Therapist with max encouragement explaining purpose of OT evaluation with Pt continuing to decline. Will follow-up 10/30/2023.    10/27/2023  "

## 2023-10-28 LAB
ANION GAP SERPL CALC-SCNC: 10 MMOL/L (ref 8–16)
BUN SERPL-MCNC: 16 MG/DL (ref 8–23)
CALCIUM SERPL-MCNC: 7.3 MG/DL (ref 8.7–10.5)
CHLORIDE SERPL-SCNC: 108 MMOL/L (ref 95–110)
CO2 SERPL-SCNC: 20 MMOL/L (ref 23–29)
CREAT SERPL-MCNC: 1.1 MG/DL (ref 0.5–1.4)
EST. GFR  (NO RACE VARIABLE): >60 ML/MIN/1.73 M^2
GLUCOSE SERPL-MCNC: 97 MG/DL (ref 70–110)
POCT GLUCOSE: 115 MG/DL (ref 70–110)
POCT GLUCOSE: 126 MG/DL (ref 70–110)
POCT GLUCOSE: 137 MG/DL (ref 70–110)
POCT GLUCOSE: 148 MG/DL (ref 70–110)
POTASSIUM SERPL-SCNC: 3.5 MMOL/L (ref 3.5–5.1)
SODIUM SERPL-SCNC: 138 MMOL/L (ref 136–145)

## 2023-10-28 PROCEDURE — 11000001 HC ACUTE MED/SURG PRIVATE ROOM

## 2023-10-28 PROCEDURE — 63600175 PHARM REV CODE 636 W HCPCS: Performed by: FAMILY MEDICINE

## 2023-10-28 PROCEDURE — 94760 N-INVAS EAR/PLS OXIMETRY 1: CPT

## 2023-10-28 PROCEDURE — 36415 COLL VENOUS BLD VENIPUNCTURE: CPT | Performed by: PHYSICIAN ASSISTANT

## 2023-10-28 PROCEDURE — 99238 PR HOSPITAL DISCHARGE DAY,<30 MIN: ICD-10-PCS | Mod: ,,, | Performed by: FAMILY MEDICINE

## 2023-10-28 PROCEDURE — 94761 N-INVAS EAR/PLS OXIMETRY MLT: CPT

## 2023-10-28 PROCEDURE — 25000003 PHARM REV CODE 250: Performed by: PHYSICIAN ASSISTANT

## 2023-10-28 PROCEDURE — 27000207 HC ISOLATION

## 2023-10-28 PROCEDURE — 97110 THERAPEUTIC EXERCISES: CPT

## 2023-10-28 PROCEDURE — 25000003 PHARM REV CODE 250: Performed by: FAMILY MEDICINE

## 2023-10-28 PROCEDURE — 99238 HOSP IP/OBS DSCHRG MGMT 30/<: CPT | Mod: ,,, | Performed by: FAMILY MEDICINE

## 2023-10-28 PROCEDURE — 80048 BASIC METABOLIC PNL TOTAL CA: CPT | Performed by: PHYSICIAN ASSISTANT

## 2023-10-28 PROCEDURE — 97530 THERAPEUTIC ACTIVITIES: CPT

## 2023-10-28 RX ORDER — FUROSEMIDE 20 MG/1
20 TABLET ORAL EVERY OTHER DAY
Qty: 30 TABLET | Refills: 0 | Status: SHIPPED | OUTPATIENT
Start: 2023-10-28 | End: 2023-10-28 | Stop reason: SDUPTHER

## 2023-10-28 RX ORDER — HYDROXYZINE HYDROCHLORIDE 25 MG/1
25 TABLET, FILM COATED ORAL 3 TIMES DAILY PRN
Qty: 30 TABLET | Refills: 0 | Status: SHIPPED | OUTPATIENT
Start: 2023-10-28 | End: 2023-10-28 | Stop reason: HOSPADM

## 2023-10-28 RX ORDER — FUROSEMIDE 20 MG/1
20 TABLET ORAL DAILY
Qty: 30 TABLET | Refills: 0 | Status: ON HOLD | OUTPATIENT
Start: 2023-10-28 | End: 2023-11-16 | Stop reason: HOSPADM

## 2023-10-28 RX ORDER — VALSARTAN 40 MG/1
20 TABLET ORAL 2 TIMES DAILY
Qty: 30 TABLET | Refills: 0 | Status: SHIPPED | OUTPATIENT
Start: 2023-10-28 | End: 2023-10-28 | Stop reason: HOSPADM

## 2023-10-28 RX ORDER — LEVOFLOXACIN 500 MG/1
500 TABLET, FILM COATED ORAL DAILY
Qty: 3 TABLET | Refills: 0 | Status: SHIPPED | OUTPATIENT
Start: 2023-10-28 | End: 2023-10-30 | Stop reason: HOSPADM

## 2023-10-28 RX ADMIN — MUPIROCIN: 20 OINTMENT TOPICAL at 08:10

## 2023-10-28 RX ADMIN — HYDROCODONE BITARTRATE AND ACETAMINOPHEN 1 TABLET: 10; 325 TABLET ORAL at 11:10

## 2023-10-28 RX ADMIN — SODIUM CHLORIDE: 9 INJECTION, SOLUTION INTRAVENOUS at 01:10

## 2023-10-28 RX ADMIN — APIXABAN 5 MG: 5 TABLET, FILM COATED ORAL at 08:10

## 2023-10-28 RX ADMIN — LEVOFLOXACIN 750 MG: 750 INJECTION, SOLUTION INTRAVENOUS at 11:10

## 2023-10-28 RX ADMIN — SODIUM CHLORIDE: 9 INJECTION, SOLUTION INTRAVENOUS at 11:10

## 2023-10-28 RX ADMIN — GABAPENTIN 100 MG: 100 CAPSULE ORAL at 08:10

## 2023-10-28 RX ADMIN — HYDROCODONE BITARTRATE AND ACETAMINOPHEN 1 TABLET: 10; 325 TABLET ORAL at 05:10

## 2023-10-28 RX ADMIN — ATORVASTATIN CALCIUM 40 MG: 40 TABLET, FILM COATED ORAL at 08:10

## 2023-10-28 RX ADMIN — PANTOPRAZOLE SODIUM 40 MG: 40 TABLET, DELAYED RELEASE ORAL at 08:10

## 2023-10-28 RX ADMIN — HYDROCODONE BITARTRATE AND ACETAMINOPHEN 1 TABLET: 10; 325 TABLET ORAL at 08:10

## 2023-10-28 NOTE — PLAN OF CARE
Problem: Physical Therapy  Goal: Physical Therapy Goal  Description:   Patient will increase functional independence with mobility by performin. Rolling to sides with supervision  2. Supine <> sit with Contact Guard Assistance  3. Sit <>Stand with Minimal Assistance using RW  4. Bed to chair transfer with Minimal Assistance with or without rolling walker using Stand Pivot TECHNIQUE  5. Gait  x ~25  feet with Moderate Assistance with or without rolling walker  6. Lower extremity exercise program x10 reps per handout, with assistance as needed     Outcome: Ongoing, Progressing

## 2023-10-28 NOTE — PT/OT/SLP PROGRESS
"Physical Therapy Treatment    Patient Name:  Debbie Zepeda   MRN:  [id number]    Recommendations:     Discharge Recommendations: Moderate Intensity Therapy  Discharge Equipment Recommendations: none (nursing home will provide)  Barriers to discharge: None    Assessment:     Debbie Zepeda is a 88 y.o. male admitted with a medical diagnosis of Sepsis due to gram-negative UTI.  He presents with the following impairments/functional limitations: weakness, impaired balance, decreased safety awareness, impaired skin, impaired endurance, pain, edema, impaired sensation, impaired cardiopulmonary response to activity, impaired self care skills, decreased ROM, impaired joint extensibility, impaired functional mobility, decreased upper extremity function, impaired coordination, impaired muscle length, gait instability, decreased lower extremity function, impaired fine motor Patient is a nursing home resident now admitted to the hospital with UTI and COVID 19 diagnosis.  He presents with swelling and dry  "crusting" skin on the right UE.  He has pitting edema on both UE/LE. Patient required max assistance with rolling side <> side with side rail, max assist with supine <> sit, sat at edge of bed for 10 minutes with set up assistance, max assistance with sit <> stand from an elevated bed, tolerated standing x 2 trials and able to make 2 side steps  by the side of the bed with RW with maximum assistance.  Noted forward flexed posture and increase cervical lordosis and thoracic kyphosis.     Rehab Prognosis: Fair; patient would benefit from acute skilled PT services to address these deficits and reach maximum level of function.    Recent Surgery: * No surgery found *      Plan:     During this hospitalization, patient to be seen 6 x/week to address the identified rehab impairments via gait training, therapeutic activities, therapeutic exercises, neuromuscular re-education and progress toward the following goals:    Plan of Care " "Expires:  11/10/23    Subjective     Chief Complaint: "I was able to walk about 8 steps with therapy at the nursing home."   Patient/Family Comments/goals: Get better.   Pain/Comfort:  Pain Rating 1:  (did not quantify)  Location - Side 1: Right  Location 1: hand  Pain Addressed 1: Reposition, Distraction, Nurse notified  Pain Rating Post-Intervention 1:  (did not quantify)      Objective:     Communicated with nurse and patient  prior to session.  Patient found supine with peripheral IV, lugo catheter, telemetry, SCD upon PT entry to room.     General Precautions: Standard, fall, droplet, contact, airborne (COVID 19)  Orthopedic Precautions: N/A  Braces: N/A  Respiratory Status: Room air     Functional Mobility:  Bed Mobility:     Rolling Left:  maximal assistance  Rolling Right: maximal assistance  Scooting: maximal assistance  Bridging: maximal assistance  Supine to Sit: maximal assistance  Sit to Supine: maximal assistance  Transfers:     Sit to Stand:  maximal assistance with rolling walker  Gait: 2 steps side ways by the bed with RW max assist x 1   Balance: set up with static sitting, mod assist with static standing with RW forward flexed posture       AM-PAC 6 CLICK MOBILITY  Turning over in bed (including adjusting bedclothes, sheets and blankets)?: 2  Sitting down on and standing up from a chair with arms (e.g., wheelchair, bedside commode, etc.): 2  Moving from lying on back to sitting on the side of the bed?: 2  Moving to and from a bed to a chair (including a wheelchair)?: 1  Need to walk in hospital room?: 1  Climbing 3-5 steps with a railing?: 1  Basic Mobility Total Score: 9       Treatment & Education:  Rolling side <> side  Supine <> sit  Scooting to the edge  of the bed   Sitting balance/tolerance  Sit <> stand with RW  Standing balance/tolerance   Side stepping by the bed  with RW   Bed positioning         Seated   Both Lower Extremity   Active ROM Sets / Reps / Resistance / Etc.   Hip Flexion 1 " x 10    LAQ 1 x 10    Ankle DF/PF                 1 x 10         PT discussed the  importance of patient's performance of Home Exercise Program (HEP)   with patient verbalizing understanding.      Patient left HOB elevated with all lines intact, call button in reach, bed alarm on, and nurse  notified..    GOALS:   Multidisciplinary Problems       Physical Therapy Goals          Problem: Physical Therapy    Goal Priority Disciplines Outcome Goal Variances Interventions   Physical Therapy Goal     PT, PT/OT Ongoing, Progressing     Description:   Patient will increase functional independence with mobility by performin. Rolling side <> side  with minimal assistance.  2. Supine <> sit with minimal Assistance  3. Sit <>Stand with Minimal Assistance using RW  4. Bed to chair transfer with Minimal Assistance with a  rolling walker using Stand Pivot TECHNIQUE  5. Gait  x ~10  feet with Moderate Assistance with a  rolling walker  6. Lower extremity exercise program x10 reps  with assistance as needed.                          Time Tracking:     PT Received On: 10/28/23  PT Start Time: 842     PT Stop Time: 924  PT Total Time (min): 42 min     Billable Minutes: Therapeutic Activity 30 and Therapeutic Exercise 12    Treatment Type: Treatment  PT/PTA: PT           10/28/2023

## 2023-10-28 NOTE — PROGRESS NOTES
[facility] Surg ([facility])  [facility] Medicine  Progress Note    Patient Name: Debbie Zepeda  MRN: [id number]  Patient Class: IP- Inpatient   Admission Date: 10/25/2023  Length of Stay: 3 days  Attending Physician: Barron Garcia MD  Primary Care Provider: Nigel Webb MD        Subjective:     Principal Problem:Sepsis due to gram-negative UTI        HPI:  Patient is an 88 year old male with medical history of HTN, HLD, PAF, CAD, HF EF45%, DM type 2, CKD stage 3 and prior stroke who was admitted to ED for urosepsis.  He was sent from nursing home with weakness, fever and hypoxia.  Patient denies any acute complaints such as cough, CP, SOB, nausea, vomiting dysuria and urinary retention.  Additionally there has been an outbreak of covid at NH and patient tested positive.        Admitted for urosepsis.  H/o neurogenic bladder and urinary retention.        Overview/Hospital Course:  PT admitted yesterday for urosepsis.  BP on lower side.  Gentle hydration.  JACKELIN mildly improving.  Will continue to monitor fluid status.  He is at high risk for fluid overload due to pulmonary HTN.  Additionally pleural effusions seen on CXR.      Urine culture gram negative rods.  Continue levofloxacin.      Urine is nearly pan sensitive. Will go home today on flouroquinolone ]        Past Medical History:   Diagnosis Date    Acute DVT (deep venous thrombosis) 2/17/2019    LLE    Cancer 1997    colon cancer    Diabetes mellitus     Hypercholesteremia     Hypertension     Pacemaker 2/17/2019       Past Surgical History:   Procedure Laterality Date    CHOLECYSTECTOMY      INCISION AND DRAINAGE OF HAND Right 10/6/2023    Procedure: INCISION AND DRAINAGE, HAND, IRRIGATION AND DRAINAGE;  Surgeon: Peter Silvestre MD;  Location: [facility] OR;  Service: Orthopedics;  Laterality: Right;  hand  local requested per the family  7am per Mahsa    INSERTION OF PACEMAKER N/A 1/14/2019    Procedure: INSERTION, PACEMAKER;  Surgeon:  Sabino Galicia MD;  Location: [facility];  Service: Cardiology;  Laterality: N/A;       Review of patient's allergies indicates:  No Known Allergies    No current facility-administered medications on file prior to encounter.     Current Outpatient Medications on File Prior to Encounter   Medication Sig    amLODIPine (NORVASC) 5 MG tablet Take 5 mg by mouth once daily.    apixaban (ELIQUIS) 5 mg Tab Take 1 tablet (5 mg total) by mouth 2 (two) times daily.    ascorbic acid, vitamin C, (VITAMIN C) 500 MG tablet Take 500 mg by mouth once daily.    atorvastatin (LIPITOR) 40 MG tablet Take 40 mg by mouth every evening.    furosemide (LASIX) 20 MG tablet Take 20 mg by mouth once daily.    gabapentin (NEURONTIN) 100 MG capsule Take 1 capsule (100 mg total) by mouth every evening.    HYDROcodone-acetaminophen (NORCO)  mg per tablet Take 1 tablet by mouth every 6 (six) hours as needed for Pain.    multivitamin (ONE DAILY MULTIVITAMIN) per tablet Take 1 tablet by mouth once daily.    omeprazole (PRILOSEC) 20 MG capsule Take 20 mg by mouth once daily.    ondansetron (ZOFRAN) 4 MG tablet Take 4 mg by mouth every 8 (eight) hours as needed for Nausea.    potassium chloride (KLOR-CON) 10 MEQ TbSR Take 1 tablet (10 mEq total) by mouth every other day.    thiamine 100 MG tablet Take 100 mg by mouth once daily.    vitamin D (VITAMIN D3) 1000 units Tab Take 1,000 Units by mouth once daily.    zinc gluconate 50 mg tablet Take 50 mg by mouth once daily.    acetaminophen (TYLENOL) 650 MG Supp Place 1 suppository (650 mg total) rectally every 4 (four) hours as needed.     Family History    None       Tobacco Use    Smoking status: Never    Smokeless tobacco: Never   Substance and Sexual Activity    Alcohol use: Yes     Comment: 2-3 Highball per night.    Drug use: No    Sexual activity: Not on file     Review of Systems   Constitutional:  Negative for chills and fever.   HENT:  Negative for ear discharge and ear  pain.    Eyes:  Negative for pain and discharge.   Respiratory:  Negative for cough and shortness of breath.    Cardiovascular:  Negative for chest pain and leg swelling.   Gastrointestinal:  Negative for nausea and vomiting.   Endocrine: Negative for cold intolerance and heat intolerance.   Genitourinary:  Negative for difficulty urinating and dysuria.   Musculoskeletal:  Negative for joint swelling and myalgias.   Skin:  Negative for rash and wound.   Neurological:  Negative for dizziness and headaches.   Psychiatric/Behavioral:  Negative for agitation and confusion.      Objective:     Vital Signs (Most Recent):  Temp: 97.9 °F (36.6 °C) (10/28/23 1117)  Pulse: 60 (10/28/23 1200)  Resp: 17 (10/28/23 1117)  BP: (!) 98/53 (10/28/23 1117)  SpO2: (!) 94 % (10/28/23 1117) Vital Signs (24h Range):  Temp:  [97.3 °F (36.3 °C)-98.9 °F (37.2 °C)] 97.9 °F (36.6 °C)  Pulse:  [55-64] 60  Resp:  [14-22] 17  SpO2:  [93 %-98 %] 94 %  BP: ()/(53-63) 98/53     Weight: 100.1 kg (220 lb 10.9 oz)  Body mass index is 28.33 kg/m².     Physical Exam  Constitutional:       General: He is not in acute distress.  HENT:      Head: Normocephalic and atraumatic.   Eyes:      General:         Right eye: No discharge.         Left eye: No discharge.   Cardiovascular:      Rate and Rhythm: Normal rate. Rhythm irregular.   Pulmonary:      Effort: Pulmonary effort is normal.      Breath sounds: Normal breath sounds.   Abdominal:      General: There is no distension.      Tenderness: There is no abdominal tenderness.   Musculoskeletal:         General: Swelling (RUE) present. No tenderness.      Cervical back: Neck supple. No tenderness.      Right lower leg: Edema (+1) present.      Left lower leg: Edema (+1) present.   Skin:     General: Skin is warm and dry.   Neurological:      General: No focal deficit present.      Mental Status: He is alert and oriented to person, place, and time.      Comments: Patient's speech is delayed but  "eventually answers.     Psychiatric:         Mood and Affect: Mood normal.         Behavior: Behavior normal.                Significant Labs: A1C:   Recent Labs   Lab 10/02/23  1455   HGBA1C 5.4       ABGs: No results for input(s): "PH", "PCO2", "HCO3", "POCSATURATED", "BE", "TOTALHB", "COHB", "METHB", "O2HB", "POCFIO2", "PO2" in the last 48 hours.  Bilirubin:   Recent Labs   Lab 10/02/23  1430 10/03/23  0536 10/25/23  1524 10/26/23  1305   BILITOT 3.9* 2.5* 0.7 0.6       Blood Culture:   No results for input(s): "LABBLOO" in the last 48 hours.    BMP:   Recent Labs   Lab 10/28/23  0603   GLU 97      K 3.5      CO2 20*   BUN 16   CREATININE 1.1   CALCIUM 7.3*       CBC:   Recent Labs   Lab 10/26/23  1305 10/27/23  0546   WBC 3.62* 2.87*   HGB 9.8* 9.5*   HCT 28.9* 28.7*   * 116*       CMP:   Recent Labs   Lab 10/26/23  1305 10/27/23  0546 10/28/23  0603   * 138 138   K 3.9 3.6 3.5    106 108   CO2 21* 23 20*   GLU 97 88 97   BUN 20 20 16   CREATININE 1.3 1.3 1.1   CALCIUM 7.7* 7.3* 7.3*   PROT 5.6*  --   --    ALBUMIN 2.1*  --   --    BILITOT 0.6  --   --    ALKPHOS 90  --   --    AST 37  --   --    ALT 19  --   --    ANIONGAP 11 9 10       Cardiac Markers:   No results for input(s): "CKMB", "MYOGLOBIN", "BNP", "TROPISTAT" in the last 48 hours.    Coagulation: No results for input(s): "PT", "INR", "APTT" in the last 48 hours.  Lactic Acid:   No results for input(s): "LACTATE" in the last 48 hours.    Lipase: No results for input(s): "LIPASE" in the last 48 hours.  Lipid Panel: No results for input(s): "CHOL", "HDL", "LDLCALC", "TRIG", "CHOLHDL" in the last 48 hours.  Magnesium: No results for input(s): "MG" in the last 48 hours.  POCT Glucose:   Recent Labs   Lab 10/27/23  2016 10/28/23  0720 10/28/23  1119   POCTGLUCOSE 109 115* 137*       Prealbumin: No results for input(s): "PREALBUMIN" in the last 48 hours.  Respiratory Culture: No results for input(s): "GSRESP", "RESPIRATORYC" in " "the last 48 hours.  Troponin:   No results for input(s): "TROPONINI", "TROPONINIHS" in the last 48 hours.    TSH: No results for input(s): "TSH" in the last 4320 hours.  Urine Culture: No results for input(s): "LABURIN" in the last 48 hours.    Urine Studies:   No results for input(s): "COLORU", "APPEARANCEUA", "PHUR", "SPECGRAV", "PROTEINUA", "GLUCUA", "KETONESU", "BILIRUBINUA", "OCCULTUA", "NITRITE", "UROBILINOGEN", "LEUKOCYTESUR", "RBCUA", "WBCUA", "BACTERIA", "SQUAMEPITHEL", "HYALINECASTS" in the last 48 hours.    Invalid input(s): "WRIGHTSUR"      Significant Imaging: I have reviewed all pertinent imaging results/findings within the past 24 hours.      Assessment/Plan:      * Sepsis due to gram-negative UTI  This patient does have evidence of infective focus  My overall impression is sepsis.  Source: Respiratory and Urinary Tract  Antibiotics given-   Antibiotics (72h ago, onward)    Start     Stop Route Frequency Ordered    10/27/23 1100  levoFLOXacin 750 mg/150 mL IVPB 750 mg         -- IV Every 24 hours (non-standard times) 10/27/23 0957    10/26/23 0900  mupirocin 2 % ointment         10/31/23 0859 Nasl 2 times daily 10/26/23 0046        Latest lactate reviewed-  Recent Labs   Lab 10/25/23  1804   LACTATE 0.7     Organ dysfunction indicated by Acute kidney injury and Acute heart failure    Fluid challenge Patient recevied 1 L bolus in the ED.  Will give IV fluids.  Pleural effusions seen on CTAP   Post- resuscitation assessment No - Post resuscitation assessment not needed       Will Not start Pressors- Levophed for MAP of 65  Source control achieved by: IV levofloxacin   Blood CX pending and urine culture gram negative rods     Home on levaquin for 3 more ays       Pulmonary hypertension  Seen on Echo   Moderate PHTN   Pulmonary effusion     Complicated urinary tract infection  U/A with 1+ leukocytes and 20 WBC on microscopic  Urine cultures gram negative rods   Continue IV levofloxacin , home on po "     Pleural effusion  BNP mildly elevated   Seen on CTAP moderate left and small right pleural effusion with adjacent atelectasis   Gentle hydration but at high risk for increasing pulmonary edema/pleural effusions      COVID-19  Patient is identified as positive for COVID   Initiate standard COVID protocols; COVID-19 testing Collection Date: 10/12/2023 Collection Time:  12:01 PM ,Infection Control notification  and isolation- respiratory, contact and droplet per protocol    Diagnostics: daily labs     Management: none at this time.  Pt is not hypoxic     Advance Care Planning  Patient is a DNR.  Lapost in chart     Acute kidney injury superimposed on chronic kidney disease  Patient with acute kidney injury/acute renal failure likely due to pre-renal azotemia due to dehydration JACKELIN is currently stable. Baseline creatinine 1.0 - Labs reviewed- Renal function/electrolytes with Estimated Creatinine Clearance: 61 mL/min (based on SCr of 1.1 mg/dL). according to latest data. Monitor urine output and serial BMP and adjust therapy as needed. Avoid nephrotoxins and renally dose meds for GFR listed above.    IV fluids.  In the setting of JACKELIN/UTI CT AP ordered and no renal stones seen.      Slowly improving     Back to baseline       Atrial fibrillation  Rate controlled  Continue eliquis     Acute pulmonary embolism  Continue home Eliquis       Type 2 diabetes mellitus with circulatory disorder  Patient's FSGs are controlled on current medication regimen.  Last A1c reviewed-   Lab Results   Component Value Date    HGBA1C 5.4 10/02/2023     Most recent fingerstick glucose reviewed-   Recent Labs   Lab 10/27/23  1622 10/27/23  2016 10/28/23  0720 10/28/23  1119   POCTGLUCOSE 138* 109 115* 137*     Current correctional scale  Low  Maintain anti-hyperglycemic dose as follows-   Antihyperglycemics (From admission, onward)    Start     Stop Route Frequency Ordered    10/26/23 1315  insulin aspart U-100 pen 0-5 Units         -- SubQ  Before meals & nightly PRN 10/26/23 1216        Hold Oral hypoglycemics while patient is in the hospital.      VTE Risk Mitigation (From admission, onward)         Ordered     apixaban tablet 5 mg  2 times daily         10/26/23 0859     IP VTE HIGH RISK PATIENT  Once         10/26/23 0045     Place sequential compression device  Until discontinued         10/26/23 0045                Discharge Planning   EDMOND:      Code Status: DNR   Is the patient medically ready for discharge?:     Reason for patient still in hospital (select all that apply): Pending disposition  Discharge Plan A: Skilled Nursing Facility                  Danny Law MD  Department of Hospital Medicine   [place] [facility]urg (3rd Fl)

## 2023-10-28 NOTE — ASSESSMENT & PLAN NOTE
U/A with 1+ leukocytes and 20 WBC on microscopic  Urine cultures gram negative rods   Continue IV levofloxacin , home on po

## 2023-10-28 NOTE — ASSESSMENT & PLAN NOTE
Patient's FSGs are controlled on current medication regimen.  Last A1c reviewed-   Lab Results   Component Value Date    HGBA1C 5.4 10/02/2023     Most recent fingerstick glucose reviewed-   Recent Labs   Lab 10/27/23  1622 10/27/23  2016 10/28/23  0720 10/28/23  1119   POCTGLUCOSE 138* 109 115* 137*     Current correctional scale  Low  Maintain anti-hyperglycemic dose as follows-   Antihyperglycemics (From admission, onward)    Start     Stop Route Frequency Ordered    10/26/23 1315  insulin aspart U-100 pen 0-5 Units         -- SubQ Before meals & nightly PRN 10/26/23 1216        Hold Oral hypoglycemics while patient is in the hospital.

## 2023-10-28 NOTE — ASSESSMENT & PLAN NOTE
Patient with acute kidney injury/acute renal failure likely due to pre-renal azotemia due to dehydration JACKELIN is currently stable. Baseline creatinine 1.0 - Labs reviewed- Renal function/electrolytes with Estimated Creatinine Clearance: 61 mL/min (based on SCr of 1.1 mg/dL). according to latest data. Monitor urine output and serial BMP and adjust therapy as needed. Avoid nephrotoxins and renally dose meds for GFR listed above.    IV fluids.  In the setting of JACKELIN/UTI CT AP ordered and no renal stones seen.      Slowly improving     Back to baseline

## 2023-10-28 NOTE — PLAN OF CARE
Patient has rested quietly this shift. Right arm dressing changed and packed with iodoform guaze. Patient has had pain managed with ordered medications. Patient has been reluctant to turn this shift. Patient has not eaten very much this shift. But did drink some milkshake that his family brought him.  Problem: Infection  Goal: Absence of Infection Signs and Symptoms  Outcome: Ongoing, Progressing     Problem: Adult Inpatient Plan of Care  Goal: Plan of Care Review  Outcome: Ongoing, Progressing  Goal: Optimal Comfort and Wellbeing  Outcome: Ongoing, Progressing     Problem: Impaired Wound Healing  Goal: Optimal Wound Healing  Outcome: Ongoing, Progressing     Problem: Fall Injury Risk  Goal: Absence of Fall and Fall-Related Injury  Outcome: Ongoing, Progressing     Problem: Skin Injury Risk Increased  Goal: Skin Health and Integrity  Outcome: Ongoing, Progressing     Problem: UTI (Urinary Tract Infection)  Goal: Improved Infection Symptoms  Outcome: Ongoing, Progressing     Problem: Fluid and Electrolyte Imbalance (Acute Kidney Injury/Impairment)  Goal: Fluid and Electrolyte Balance  Outcome: Ongoing, Progressing     Problem: Oral Intake Inadequate (Acute Kidney Injury/Impairment)  Goal: Optimal Nutrition Intake  Outcome: Ongoing, Progressing     Problem: Pain Acute  Goal: Acceptable Pain Control and Functional Ability  Outcome: Ongoing, Progressing

## 2023-10-28 NOTE — PLAN OF CARE
Problem: Physical Therapy  Goal: Physical Therapy Goal  Description:   Patient will increase functional independence with mobility by performin. Rolling side <> side  with minimal assistance.  2. Supine <> sit with minimal Assistance  3. Sit <>Stand with Minimal Assistance using RW  4. Bed to chair transfer with Minimal Assistance with a  rolling walker using Stand Pivot TECHNIQUE  5. Gait  x ~10  feet with Moderate Assistance with a  rolling walker  6. Lower extremity exercise program x10 reps  with assistance as needed.     Outcome: Ongoing, Progressing

## 2023-10-28 NOTE — NURSING
Spoke with Maren with case management to confirm that [facility] would not be able to accept the patient until Monday due to insurance requirements.  Notified Dr. Law of this.

## 2023-10-28 NOTE — SUBJECTIVE & OBJECTIVE
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       Past Surgical History:   Procedure Laterality Date    CHOLECYSTECTOMY      INCISION AND DRAINAGE OF HAND Right 10/6/2023    Procedure: INCISION AND DRAINAGE, HAND, IRRIGATION AND DRAINAGE;  Surgeon: Peter Silvestre MD;  Location: [facility] OR;  Service: Orthopedics;  Laterality: Right;  hand  local requested per the family  7am per Mahsa    INSERTION OF PACEMAKER N/A 1/14/2019    Procedure: INSERTION, PACEMAKER;  Surgeon: Sabino Galicia MD;  Location: [facility] CATH;  Service: Cardiology;  Laterality: N/A;       Review of patient's allergies indicates:  No Known Allergies    No current facility-administered medications on file prior to encounter.     Current Outpatient Medications on File Prior to Encounter   Medication Sig    amLODIPine (NORVASC) 5 MG tablet Take 5 mg by mouth once daily.    apixaban (ELIQUIS) 5 mg Tab Take 1 tablet (5 mg total) by mouth 2 (two) times daily.    ascorbic acid, vitamin C, (VITAMIN C) 500 MG tablet Take 500 mg by mouth once daily.    atorvastatin (LIPITOR) 40 MG tablet Take 40 mg by mouth every evening.    furosemide (LASIX) 20 MG tablet Take 20 mg by mouth once daily.    gabapentin (NEURONTIN) 100 MG capsule Take 1 capsule (100 mg total) by mouth every evening.    HYDROcodone-acetaminophen (NORCO)  mg per tablet Take 1 tablet by mouth every 6 (six) hours as needed for Pain.    multivitamin (ONE DAILY MULTIVITAMIN) per tablet Take 1 tablet by mouth once daily.    omeprazole (PRILOSEC) 20 MG capsule Take 20 mg by mouth once daily.    ondansetron (ZOFRAN) 4 MG tablet Take 4 mg by mouth every 8 (eight) hours as needed for Nausea.    potassium chloride (KLOR-CON) 10 MEQ TbSR Take 1 tablet (10 mEq total) by mouth every other day.    thiamine 100 MG tablet Take 100 mg by  mouth once daily.    vitamin D (VITAMIN D3) 1000 units Tab Take 1,000 Units by mouth once daily.    zinc gluconate 50 mg tablet Take 50 mg by mouth once daily.    acetaminophen (TYLENOL) 650 MG Supp Place 1 suppository (650 mg total) rectally every 4 (four) hours as needed.     Family History    None       Tobacco Use    Smoking status: Never    Smokeless tobacco: Never   Substance and Sexual Activity    Alcohol use: Yes     Comment: 2-3 [place] per night.    Drug use: No    Sexual activity: Not on file     Review of Systems   Constitutional:  Negative for chills and fever.   HENT:  Negative for ear discharge and ear pain.    Eyes:  Negative for pain and discharge.   Respiratory:  Negative for cough and shortness of breath.    Cardiovascular:  Negative for chest pain and leg swelling.   Gastrointestinal:  Negative for nausea and vomiting.   Endocrine: Negative for cold intolerance and heat intolerance.   Genitourinary:  Negative for difficulty urinating and dysuria.   Musculoskeletal:  Negative for joint swelling and myalgias.   Skin:  Negative for rash and wound.   Neurological:  Negative for dizziness and headaches.   Psychiatric/Behavioral:  Negative for agitation and confusion.      Objective:     Vital Signs (Most Recent):  Temp: 97.9 °F (36.6 °C) (10/28/23 1117)  Pulse: 60 (10/28/23 1200)  Resp: 17 (10/28/23 1117)  BP: (!) 98/53 (10/28/23 1117)  SpO2: (!) 94 % (10/28/23 1117) Vital Signs (24h Range):  Temp:  [97.3 °F (36.3 °C)-98.9 °F (37.2 °C)] 97.9 °F (36.6 °C)  Pulse:  [55-64] 60  Resp:  [14-22] 17  SpO2:  [93 %-98 %] 94 %  BP: ()/(53-63) 98/53     Weight: 100.1 kg (220 lb 10.9 oz)  Body mass index is 28.33 kg/m².     Physical Exam  Constitutional:       General: He is not in acute distress.  HENT:      Head: Normocephalic and atraumatic.   Eyes:      General:         Right eye: No discharge.         Left eye: No discharge.   Cardiovascular:      Rate and Rhythm: Normal rate. Rhythm irregular.  "  Pulmonary:      Effort: Pulmonary effort is normal.      Breath sounds: Normal breath sounds.   Abdominal:      General: There is no distension.      Tenderness: There is no abdominal tenderness.   Musculoskeletal:         General: Swelling (RUE) present. No tenderness.      Cervical back: Neck supple. No tenderness.      Right lower leg: Edema (+1) present.      Left lower leg: Edema (+1) present.   Skin:     General: Skin is warm and dry.   Neurological:      General: No focal deficit present.      Mental Status: He is alert and oriented to person, place, and time.      Comments: Patient's speech is delayed but eventually answers.     Psychiatric:         Mood and Affect: Mood normal.         Behavior: Behavior normal.                Significant Labs: A1C:   Recent Labs   Lab 10/02/23  1455   HGBA1C 5.4       ABGs: No results for input(s): "PH", "PCO2", "HCO3", "POCSATURATED", "BE", "TOTALHB", "COHB", "METHB", "O2HB", "POCFIO2", "PO2" in the last 48 hours.  Bilirubin:   Recent Labs   Lab 10/02/23  1430 10/03/23  0536 10/25/23  1524 10/26/23  1305   BILITOT 3.9* 2.5* 0.7 0.6       Blood Culture:   No results for input(s): "LABBLOO" in the last 48 hours.    BMP:   Recent Labs   Lab 10/28/23  0603   GLU 97      K 3.5      CO2 20*   BUN 16   CREATININE 1.1   CALCIUM 7.3*       CBC:   Recent Labs   Lab 10/26/23  1305 10/27/23  0546   WBC 3.62* 2.87*   HGB 9.8* 9.5*   HCT 28.9* 28.7*   * 116*       CMP:   Recent Labs   Lab 10/26/23  1305 10/27/23  0546 10/28/23  0603   * 138 138   K 3.9 3.6 3.5    106 108   CO2 21* 23 20*   GLU 97 88 97   BUN 20 20 16   CREATININE 1.3 1.3 1.1   CALCIUM 7.7* 7.3* 7.3*   PROT 5.6*  --   --    ALBUMIN 2.1*  --   --    BILITOT 0.6  --   --    ALKPHOS 90  --   --    AST 37  --   --    ALT 19  --   --    ANIONGAP 11 9 10       Cardiac Markers:   No results for input(s): "CKMB", "MYOGLOBIN", "BNP", "TROPISTAT" in the last 48 hours.    Coagulation: No results " "for input(s): "PT", "INR", "APTT" in the last 48 hours.  Lactic Acid:   No results for input(s): "LACTATE" in the last 48 hours.    Lipase: No results for input(s): "LIPASE" in the last 48 hours.  Lipid Panel: No results for input(s): "CHOL", "HDL", "LDLCALC", "TRIG", "CHOLHDL" in the last 48 hours.  Magnesium: No results for input(s): "MG" in the last 48 hours.  POCT Glucose:   Recent Labs   Lab 10/27/23  2016 10/28/23  0720 10/28/23  1119   POCTGLUCOSE 109 115* 137*       Prealbumin: No results for input(s): "PREALBUMIN" in the last 48 hours.  Respiratory Culture: No results for input(s): "GSRESP", "RESPIRATORYC" in the last 48 hours.  Troponin:   No results for input(s): "TROPONINI", "TROPONINIHS" in the last 48 hours.    TSH: No results for input(s): "TSH" in the last 4320 hours.  Urine Culture: No results for input(s): "LABURIN" in the last 48 hours.    Urine Studies:   No results for input(s): "COLORU", "APPEARANCEUA", "PHUR", "SPECGRAV", "PROTEINUA", "GLUCUA", "KETONESU", "BILIRUBINUA", "OCCULTUA", "NITRITE", "UROBILINOGEN", "LEUKOCYTESUR", "RBCUA", "WBCUA", "BACTERIA", "SQUAMEPITHEL", "HYALINECASTS" in the last 48 hours.    Invalid input(s): "WRIGHTSUR"      Significant Imaging: I have reviewed all pertinent imaging results/findings within the past 24 hours.  "

## 2023-10-28 NOTE — ASSESSMENT & PLAN NOTE
This patient does have evidence of infective focus  My overall impression is sepsis.  Source: Respiratory and Urinary Tract  Antibiotics given-   Antibiotics (72h ago, onward)    Start     Stop Route Frequency Ordered    10/27/23 1100  levoFLOXacin 750 mg/150 mL IVPB 750 mg         -- IV Every 24 hours (non-standard times) 10/27/23 0957    10/26/23 0900  mupirocin 2 % ointment         10/31/23 0859 Nasl 2 times daily 10/26/23 0046        Latest lactate reviewed-  Recent Labs   Lab 10/25/23  1804   LACTATE 0.7     Organ dysfunction indicated by Acute kidney injury and Acute heart failure    Fluid challenge Patient recevied 1 L bolus in the ED.  Will give IV fluids.  Pleural effusions seen on CTAP   Post- resuscitation assessment No - Post resuscitation assessment not needed       Will Not start Pressors- Levophed for MAP of 65  Source control achieved by: IV levofloxacin   Blood CX pending and urine culture gram negative rods     Home on levaquin for 3 more ays

## 2023-10-29 LAB
POCT GLUCOSE: 114 MG/DL (ref 70–110)
POCT GLUCOSE: 121 MG/DL (ref 70–110)
POCT GLUCOSE: 124 MG/DL (ref 70–110)
POCT GLUCOSE: 132 MG/DL (ref 70–110)

## 2023-10-29 PROCEDURE — 25000003 PHARM REV CODE 250: Performed by: PHYSICIAN ASSISTANT

## 2023-10-29 PROCEDURE — 99900035 HC TECH TIME PER 15 MIN (STAT)

## 2023-10-29 PROCEDURE — 99900031 HC PATIENT EDUCATION (STAT)

## 2023-10-29 PROCEDURE — 25000003 PHARM REV CODE 250: Performed by: FAMILY MEDICINE

## 2023-10-29 PROCEDURE — 99232 PR SUBSEQUENT HOSPITAL CARE,LEVL II: ICD-10-PCS | Mod: ,,, | Performed by: FAMILY MEDICINE

## 2023-10-29 PROCEDURE — 94761 N-INVAS EAR/PLS OXIMETRY MLT: CPT

## 2023-10-29 PROCEDURE — 27000207 HC ISOLATION

## 2023-10-29 PROCEDURE — 63600175 PHARM REV CODE 636 W HCPCS: Performed by: FAMILY MEDICINE

## 2023-10-29 PROCEDURE — 11000001 HC ACUTE MED/SURG PRIVATE ROOM

## 2023-10-29 PROCEDURE — 99232 SBSQ HOSP IP/OBS MODERATE 35: CPT | Mod: ,,, | Performed by: FAMILY MEDICINE

## 2023-10-29 RX ADMIN — SODIUM CHLORIDE: 9 INJECTION, SOLUTION INTRAVENOUS at 09:10

## 2023-10-29 RX ADMIN — GABAPENTIN 100 MG: 100 CAPSULE ORAL at 09:10

## 2023-10-29 RX ADMIN — ATORVASTATIN CALCIUM 40 MG: 40 TABLET, FILM COATED ORAL at 09:10

## 2023-10-29 RX ADMIN — MUPIROCIN: 20 OINTMENT TOPICAL at 09:10

## 2023-10-29 RX ADMIN — MUPIROCIN: 20 OINTMENT TOPICAL at 08:10

## 2023-10-29 RX ADMIN — HYDROCODONE BITARTRATE AND ACETAMINOPHEN 1 TABLET: 10; 325 TABLET ORAL at 10:10

## 2023-10-29 RX ADMIN — PANTOPRAZOLE SODIUM 40 MG: 40 TABLET, DELAYED RELEASE ORAL at 08:10

## 2023-10-29 RX ADMIN — HYDROCODONE BITARTRATE AND ACETAMINOPHEN 1 TABLET: 10; 325 TABLET ORAL at 05:10

## 2023-10-29 RX ADMIN — LEVOFLOXACIN 750 MG: 750 INJECTION, SOLUTION INTRAVENOUS at 11:10

## 2023-10-29 RX ADMIN — APIXABAN 5 MG: 5 TABLET, FILM COATED ORAL at 09:10

## 2023-10-29 RX ADMIN — APIXABAN 5 MG: 5 TABLET, FILM COATED ORAL at 08:10

## 2023-10-29 NOTE — PLAN OF CARE
Problem: Infection  Goal: Absence of Infection Signs and Symptoms  Outcome: Ongoing, Progressing     Problem: Adult Inpatient Plan of Care  Goal: Plan of Care Review  Outcome: Ongoing, Progressing  Goal: Patient-Specific Goal (Individualized)  Outcome: Ongoing, Progressing  Goal: Absence of Hospital-Acquired Illness or Injury  Outcome: Ongoing, Progressing  Goal: Optimal Comfort and Wellbeing  Outcome: Ongoing, Progressing  Goal: Readiness for Transition of Care  Outcome: Ongoing, Progressing     Problem: Diabetes Comorbidity  Goal: Blood Glucose Level Within Targeted Range  Outcome: Ongoing, Progressing     Problem: Impaired Wound Healing  Goal: Optimal Wound Healing  Outcome: Ongoing, Progressing     Problem: Fall Injury Risk  Goal: Absence of Fall and Fall-Related Injury  Outcome: Ongoing, Progressing     Problem: Skin Injury Risk Increased  Goal: Skin Health and Integrity  Outcome: Ongoing, Progressing     Problem: UTI (Urinary Tract Infection)  Goal: Improved Infection Symptoms  Outcome: Ongoing, Progressing     Problem: Fluid and Electrolyte Imbalance (Acute Kidney Injury/Impairment)  Goal: Fluid and Electrolyte Balance  Outcome: Ongoing, Progressing     Problem: Oral Intake Inadequate (Acute Kidney Injury/Impairment)  Goal: Optimal Nutrition Intake  Outcome: Ongoing, Progressing     Problem: Pain Acute  Goal: Acceptable Pain Control and Functional Ability  Outcome: Ongoing, Progressing     Problem: Renal Function Impairment (Acute Kidney Injury/Impairment)  Goal: Effective Renal Function  Outcome: Ongoing, Progressing

## 2023-10-29 NOTE — ASSESSMENT & PLAN NOTE
"This patient does have evidence of infective focus  My overall impression is sepsis.  Source: Respiratory and Urinary Tract  Antibiotics given-   Antibiotics (72h ago, onward)    Start     Stop Route Frequency Ordered    10/27/23 1100  levoFLOXacin 750 mg/150 mL IVPB 750 mg         -- IV Every 24 hours (non-standard times) 10/27/23 0957    10/26/23 0900  mupirocin 2 % ointment         10/31/23 0859 Nasl 2 times daily 10/26/23 0046        Latest lactate reviewed-  No results for input(s): "LACTATE" in the last 72 hours.  Organ dysfunction indicated by Acute kidney injury and Acute heart failure    Fluid challenge Patient recevied 1 L bolus in the ED.  Will give IV fluids.  Pleural effusions seen on CTAP   Post- resuscitation assessment No - Post resuscitation assessment not needed       Will Not start Pressors- Levophed for MAP of 65  Source control achieved by: IV levofloxacin   Blood CX pending and urine culture gram negative rods     Home on levaquin for 3 mored ays     "

## 2023-10-29 NOTE — PLAN OF CARE
Problem: Infection  Goal: Absence of Infection Signs and Symptoms  Outcome: Ongoing, Progressing     Problem: Adult Inpatient Plan of Care  Goal: Plan of Care Review  Outcome: Ongoing, Progressing  Goal: Optimal Comfort and Wellbeing  Outcome: Ongoing, Progressing  Goal: Readiness for Transition of Care  Outcome: Ongoing, Progressing     Problem: Diabetes Comorbidity  Goal: Blood Glucose Level Within Targeted Range  Outcome: Ongoing, Progressing     Problem: Impaired Wound Healing  Goal: Optimal Wound Healing  Outcome: Ongoing, Progressing     Problem: Fall Injury Risk  Goal: Absence of Fall and Fall-Related Injury  Outcome: Ongoing, Progressing     Problem: Skin Injury Risk Increased  Goal: Skin Health and Integrity  Outcome: Ongoing, Progressing     Problem: UTI (Urinary Tract Infection)  Goal: Improved Infection Symptoms  Outcome: Ongoing, Progressing     Problem: Fluid and Electrolyte Imbalance (Acute Kidney Injury/Impairment)  Goal: Fluid and Electrolyte Balance  Outcome: Ongoing, Progressing     Problem: Oral Intake Inadequate (Acute Kidney Injury/Impairment)  Goal: Optimal Nutrition Intake  Outcome: Ongoing, Progressing     Problem: Renal Function Impairment (Acute Kidney Injury/Impairment)  Goal: Effective Renal Function  Outcome: Ongoing, Progressing     Problem: Pain Acute  Goal: Acceptable Pain Control and Functional Ability  Outcome: Ongoing, Progressing

## 2023-10-29 NOTE — NURSING
Return phone call to Rahel patient's daughter to update her on plan of care and plan to return to NH tomorrow. Informed her her father is still on IV antibiotics.

## 2023-10-29 NOTE — ASSESSMENT & PLAN NOTE
Patient's FSGs are controlled on current medication regimen.  Last A1c reviewed-   Lab Results   Component Value Date    HGBA1C 5.4 10/02/2023     Most recent fingerstick glucose reviewed-   Recent Labs   Lab 10/28/23  1119 10/28/23  1616 10/28/23  2022 10/29/23  0733   POCTGLUCOSE 137* 126* 148* 121*     Current correctional scale  Low  Maintain anti-hyperglycemic dose as follows-   Antihyperglycemics (From admission, onward)    Start     Stop Route Frequency Ordered    10/26/23 1315  insulin aspart U-100 pen 0-5 Units         -- SubQ Before meals & nightly PRN 10/26/23 1216        Hold Oral hypoglycemics while patient is in the hospital.

## 2023-10-29 NOTE — SUBJECTIVE & OBJECTIVE
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       Past Surgical History:   Procedure Laterality Date    CHOLECYSTECTOMY      INCISION AND DRAINAGE OF HAND Right 10/6/2023    Procedure: INCISION AND DRAINAGE, HAND, IRRIGATION AND DRAINAGE;  Surgeon: Peter Silvestre MD;  Location: [facility] OR;  Service: Orthopedics;  Laterality: Right;  hand  local requested per the family  7am per Mahsa    INSERTION OF PACEMAKER N/A 1/14/2019    Procedure: INSERTION, PACEMAKER;  Surgeon: Sabino Galicia MD;  Location: [facility] CATH;  Service: Cardiology;  Laterality: N/A;       Review of patient's allergies indicates:  No Known Allergies    No current facility-administered medications on file prior to encounter.     Current Outpatient Medications on File Prior to Encounter   Medication Sig    apixaban (ELIQUIS) 5 mg Tab Take 1 tablet (5 mg total) by mouth 2 (two) times daily.    ascorbic acid, vitamin C, (VITAMIN C) 500 MG tablet Take 500 mg by mouth once daily.    atorvastatin (LIPITOR) 40 MG tablet Take 40 mg by mouth every evening.    gabapentin (NEURONTIN) 100 MG capsule Take 1 capsule (100 mg total) by mouth every evening.    HYDROcodone-acetaminophen (NORCO)  mg per tablet Take 1 tablet by mouth every 6 (six) hours as needed for Pain.    multivitamin (ONE DAILY MULTIVITAMIN) per tablet Take 1 tablet by mouth once daily.    omeprazole (PRILOSEC) 20 MG capsule Take 20 mg by mouth once daily.    ondansetron (ZOFRAN) 4 MG tablet Take 4 mg by mouth every 8 (eight) hours as needed for Nausea.    potassium chloride (KLOR-CON) 10 MEQ TbSR Take 1 tablet (10 mEq total) by mouth every other day.    thiamine 100 MG tablet Take 100 mg by mouth once daily.    vitamin D (VITAMIN D3) 1000 units Tab Take 1,000 Units by mouth once daily.    zinc gluconate 50 mg tablet Take 50 mg  by mouth once daily.    acetaminophen (TYLENOL) 650 MG Supp Place 1 suppository (650 mg total) rectally every 4 (four) hours as needed.     Family History    None       Tobacco Use    Smoking status: Never    Smokeless tobacco: Never   Substance and Sexual Activity    Alcohol use: Yes     Comment: 2-3 [place] per night.    Drug use: No    Sexual activity: Not on file     Review of Systems   Constitutional:  Negative for chills and fever.   HENT:  Negative for ear discharge and ear pain.    Eyes:  Negative for pain and discharge.   Respiratory:  Negative for cough and shortness of breath.    Cardiovascular:  Negative for chest pain and leg swelling.   Gastrointestinal:  Negative for nausea and vomiting.   Endocrine: Negative for cold intolerance and heat intolerance.   Genitourinary:  Negative for difficulty urinating and dysuria.   Musculoskeletal:  Negative for joint swelling and myalgias.   Skin:  Negative for rash and wound.   Neurological:  Negative for dizziness and headaches.   Psychiatric/Behavioral:  Negative for agitation and confusion.      Objective:     Vital Signs (Most Recent):  Temp: 97.9 °F (36.6 °C) (10/29/23 0730)  Pulse: 61 (10/29/23 0956)  Resp: 20 (10/29/23 0730)  BP: (!) 108/55 (10/29/23 0730)  SpO2: 96 % (10/29/23 0730) Vital Signs (24h Range):  Temp:  [97.7 °F (36.5 °C)-98.9 °F (37.2 °C)] 97.9 °F (36.6 °C)  Pulse:  [59-76] 61  Resp:  [17-20] 20  SpO2:  [94 %-98 %] 96 %  BP: ()/(52-59) 108/55     Weight: 100.1 kg (220 lb 10.9 oz)  Body mass index is 28.33 kg/m².     Physical Exam  Constitutional:       General: He is not in acute distress.  HENT:      Head: Normocephalic and atraumatic.   Eyes:      General:         Right eye: No discharge.         Left eye: No discharge.   Cardiovascular:      Rate and Rhythm: Normal rate. Rhythm irregular.   Pulmonary:      Effort: Pulmonary effort is normal.      Breath sounds: Normal breath sounds.   Abdominal:      General: There is no distension.  "     Tenderness: There is no abdominal tenderness.   Musculoskeletal:         General: Swelling (RUE) present. No tenderness.      Cervical back: Neck supple. No tenderness.      Right lower leg: Edema (+1) present.      Left lower leg: Edema (+1) present.   Skin:     General: Skin is warm and dry.   Neurological:      General: No focal deficit present.      Mental Status: He is alert and oriented to person, place, and time.      Comments: Patient's speech is delayed but eventually answers.     Psychiatric:         Mood and Affect: Mood normal.         Behavior: Behavior normal.                Significant Labs: A1C:   Recent Labs   Lab 10/02/23  1455   HGBA1C 5.4       ABGs: No results for input(s): "PH", "PCO2", "HCO3", "POCSATURATED", "BE", "TOTALHB", "COHB", "METHB", "O2HB", "POCFIO2", "PO2" in the last 48 hours.  Bilirubin:   Recent Labs   Lab 10/02/23  1430 10/03/23  0536 10/25/23  1524 10/26/23  1305   BILITOT 3.9* 2.5* 0.7 0.6       Blood Culture:   No results for input(s): "LABBLOO" in the last 48 hours.    BMP:   Recent Labs   Lab 10/28/23  0603   GLU 97      K 3.5      CO2 20*   BUN 16   CREATININE 1.1   CALCIUM 7.3*       CBC:   No results for input(s): "WBC", "HGB", "HCT", "PLT" in the last 48 hours.    CMP:   Recent Labs   Lab 10/28/23  0603      K 3.5      CO2 20*   GLU 97   BUN 16   CREATININE 1.1   CALCIUM 7.3*   ANIONGAP 10       Cardiac Markers:   No results for input(s): "CKMB", "MYOGLOBIN", "BNP", "TROPISTAT" in the last 48 hours.    Coagulation: No results for input(s): "PT", "INR", "APTT" in the last 48 hours.  Lactic Acid:   No results for input(s): "LACTATE" in the last 48 hours.    Lipase: No results for input(s): "LIPASE" in the last 48 hours.  Lipid Panel: No results for input(s): "CHOL", "HDL", "LDLCALC", "TRIG", "CHOLHDL" in the last 48 hours.  Magnesium: No results for input(s): "MG" in the last 48 hours.  POCT Glucose:   Recent Labs   Lab 10/28/23  1616 " "10/28/23  2022 10/29/23  0733   POCTGLUCOSE 126* 148* 121*       Prealbumin: No results for input(s): "PREALBUMIN" in the last 48 hours.  Respiratory Culture: No results for input(s): "GSRESP", "RESPIRATORYC" in the last 48 hours.  Troponin:   No results for input(s): "TROPONINI", "TROPONINIHS" in the last 48 hours.    TSH: No results for input(s): "TSH" in the last 4320 hours.  Urine Culture: No results for input(s): "LABURIN" in the last 48 hours.    Urine Studies:   No results for input(s): "COLORU", "APPEARANCEUA", "PHUR", "SPECGRAV", "PROTEINUA", "GLUCUA", "KETONESU", "BILIRUBINUA", "OCCULTUA", "NITRITE", "UROBILINOGEN", "LEUKOCYTESUR", "RBCUA", "WBCUA", "BACTERIA", "SQUAMEPITHEL", "HYALINECASTS" in the last 48 hours.    Invalid input(s): "WRIGHTSUR"      Significant Imaging: I have reviewed all pertinent imaging results/findings within the past 24 hours.  "

## 2023-10-29 NOTE — PROGRESS NOTES
[facility] Surg ([facility])  [facility] Medicine  Progress Note    Patient Name: Debbie Zepeda  MRN: [id number]  Patient Class: IP- Inpatient   Admission Date: 10/25/2023  Length of Stay: 4 days  Attending Physician: Barron Garcia MD  Primary Care Provider: Nigel Webb MD        Subjective:     Principal Problem:Sepsis due to gram-negative UTI        HPI:  Patient is an 88 year old male with medical history of HTN, HLD, PAF, CAD, HF EF45%, DM type 2, CKD stage 3 and prior stroke who was admitted to ED for urosepsis.  He was sent from nursing home with weakness, fever and hypoxia.  Patient denies any acute complaints such as cough, CP, SOB, nausea, vomiting dysuria and urinary retention.  Additionally there has been an outbreak of covid at NH and patient tested positive.        Admitted for urosepsis.  H/o neurogenic bladder and urinary retention.        Overview/Hospital Course:  PT admitted yesterday for urosepsis.  BP on lower side.  Gentle hydration.  JACKELIN mildly improving.  Will continue to monitor fluid status.  He is at high risk for fluid overload due to pulmonary HTN.  Additionally pleural effusions seen on CXR.      Urine culture gram negative rods.  Continue levofloxacin.      Urine is nearly pan sensitive. Will go home today on flouroquinolone ]      10/29  D/c home yesterday but NH denied to take him. Can't take a skilled pt on the weekends is what I was told. Should go home tomorrow  No issues overnight   AFVSS       Past Medical History:   Diagnosis Date    Acute DVT (deep venous thrombosis) 2/17/2019    LLE    Cancer 1997    colon cancer    Diabetes mellitus     Hypercholesteremia     Hypertension     Pacemaker 2/17/2019       Past Surgical History:   Procedure Laterality Date    CHOLECYSTECTOMY      INCISION AND DRAINAGE OF HAND Right 10/6/2023    Procedure: INCISION AND DRAINAGE, HAND, IRRIGATION AND DRAINAGE;  Surgeon: Peter Silvestre MD;  Location: [facility] OR;  Service:  Orthopedics;  Laterality: Right;  hand  local requested per the family  7am per Mahsa    INSERTION OF PACEMAKER N/A 1/14/2019    Procedure: INSERTION, PACEMAKER;  Surgeon: Sabino Galicia MD;  Location: [facility] CATH;  Service: Cardiology;  Laterality: N/A;       Review of patient's allergies indicates:  No Known Allergies    No current facility-administered medications on file prior to encounter.     Current Outpatient Medications on File Prior to Encounter   Medication Sig    apixaban (ELIQUIS) 5 mg Tab Take 1 tablet (5 mg total) by mouth 2 (two) times daily.    ascorbic acid, vitamin C, (VITAMIN C) 500 MG tablet Take 500 mg by mouth once daily.    atorvastatin (LIPITOR) 40 MG tablet Take 40 mg by mouth every evening.    gabapentin (NEURONTIN) 100 MG capsule Take 1 capsule (100 mg total) by mouth every evening.    HYDROcodone-acetaminophen (NORCO)  mg per tablet Take 1 tablet by mouth every 6 (six) hours as needed for Pain.    multivitamin (ONE DAILY MULTIVITAMIN) per tablet Take 1 tablet by mouth once daily.    omeprazole (PRILOSEC) 20 MG capsule Take 20 mg by mouth once daily.    ondansetron (ZOFRAN) 4 MG tablet Take 4 mg by mouth every 8 (eight) hours as needed for Nausea.    potassium chloride (KLOR-CON) 10 MEQ TbSR Take 1 tablet (10 mEq total) by mouth every other day.    thiamine 100 MG tablet Take 100 mg by mouth once daily.    vitamin D (VITAMIN D3) 1000 units Tab Take 1,000 Units by mouth once daily.    zinc gluconate 50 mg tablet Take 50 mg by mouth once daily.    acetaminophen (TYLENOL) 650 MG Supp Place 1 suppository (650 mg total) rectally every 4 (four) hours as needed.     Family History    None       Tobacco Use    Smoking status: Never    Smokeless tobacco: Never   Substance and Sexual Activity    Alcohol use: Yes     Comment: 2-3 [place] per night.    Drug use: No    Sexual activity: Not on file     Review of Systems   Constitutional:  Negative for chills and fever.    HENT:  Negative for ear discharge and ear pain.    Eyes:  Negative for pain and discharge.   Respiratory:  Negative for cough and shortness of breath.    Cardiovascular:  Negative for chest pain and leg swelling.   Gastrointestinal:  Negative for nausea and vomiting.   Endocrine: Negative for cold intolerance and heat intolerance.   Genitourinary:  Negative for difficulty urinating and dysuria.   Musculoskeletal:  Negative for joint swelling and myalgias.   Skin:  Negative for rash and wound.   Neurological:  Negative for dizziness and headaches.   Psychiatric/Behavioral:  Negative for agitation and confusion.      Objective:     Vital Signs (Most Recent):  Temp: 97.9 °F (36.6 °C) (10/29/23 0730)  Pulse: 61 (10/29/23 0956)  Resp: 20 (10/29/23 0730)  BP: (!) 108/55 (10/29/23 0730)  SpO2: 96 % (10/29/23 0730) Vital Signs (24h Range):  Temp:  [97.7 °F (36.5 °C)-98.9 °F (37.2 °C)] 97.9 °F (36.6 °C)  Pulse:  [59-76] 61  Resp:  [17-20] 20  SpO2:  [94 %-98 %] 96 %  BP: ()/(52-59) 108/55     Weight: 100.1 kg (220 lb 10.9 oz)  Body mass index is 28.33 kg/m².     Physical Exam  Constitutional:       General: He is not in acute distress.  HENT:      Head: Normocephalic and atraumatic.   Eyes:      General:         Right eye: No discharge.         Left eye: No discharge.   Cardiovascular:      Rate and Rhythm: Normal rate. Rhythm irregular.   Pulmonary:      Effort: Pulmonary effort is normal.      Breath sounds: Normal breath sounds.   Abdominal:      General: There is no distension.      Tenderness: There is no abdominal tenderness.   Musculoskeletal:         General: Swelling (RUE) present. No tenderness.      Cervical back: Neck supple. No tenderness.      Right lower leg: Edema (+1) present.      Left lower leg: Edema (+1) present.   Skin:     General: Skin is warm and dry.   Neurological:      General: No focal deficit present.      Mental Status: He is alert and oriented to person, place, and time.      Comments:  "Patient's speech is delayed but eventually answers.     Psychiatric:         Mood and Affect: Mood normal.         Behavior: Behavior normal.                Significant Labs: A1C:   Recent Labs   Lab 10/02/23  1455   HGBA1C 5.4       ABGs: No results for input(s): "PH", "PCO2", "HCO3", "POCSATURATED", "BE", "TOTALHB", "COHB", "METHB", "O2HB", "POCFIO2", "PO2" in the last 48 hours.  Bilirubin:   Recent Labs   Lab 10/02/23  1430 10/03/23  0536 10/25/23  1524 10/26/23  1305   BILITOT 3.9* 2.5* 0.7 0.6       Blood Culture:   No results for input(s): "LABBLOO" in the last 48 hours.    BMP:   Recent Labs   Lab 10/28/23  0603   GLU 97      K 3.5      CO2 20*   BUN 16   CREATININE 1.1   CALCIUM 7.3*       CBC:   No results for input(s): "WBC", "HGB", "HCT", "PLT" in the last 48 hours.    CMP:   Recent Labs   Lab 10/28/23  0603      K 3.5      CO2 20*   GLU 97   BUN 16   CREATININE 1.1   CALCIUM 7.3*   ANIONGAP 10       Cardiac Markers:   No results for input(s): "CKMB", "MYOGLOBIN", "BNP", "TROPISTAT" in the last 48 hours.    Coagulation: No results for input(s): "PT", "INR", "APTT" in the last 48 hours.  Lactic Acid:   No results for input(s): "LACTATE" in the last 48 hours.    Lipase: No results for input(s): "LIPASE" in the last 48 hours.  Lipid Panel: No results for input(s): "CHOL", "HDL", "LDLCALC", "TRIG", "CHOLHDL" in the last 48 hours.  Magnesium: No results for input(s): "MG" in the last 48 hours.  POCT Glucose:   Recent Labs   Lab 10/28/23  1616 10/28/23  2022 10/29/23  0733   POCTGLUCOSE 126* 148* 121*       Prealbumin: No results for input(s): "PREALBUMIN" in the last 48 hours.  Respiratory Culture: No results for input(s): "GSRESP", "RESPIRATORYC" in the last 48 hours.  Troponin:   No results for input(s): "TROPONINI", "TROPONINIHS" in the last 48 hours.    TSH: No results for input(s): "TSH" in the last 4320 hours.  Urine Culture: No results for input(s): "LABURIN" in the last 48 " "hours.    Urine Studies:   No results for input(s): "COLORU", "APPEARANCEUA", "PHUR", "SPECGRAV", "PROTEINUA", "GLUCUA", "KETONESU", "BILIRUBINUA", "OCCULTUA", "NITRITE", "UROBILINOGEN", "LEUKOCYTESUR", "RBCUA", "WBCUA", "BACTERIA", "SQUAMEPITHEL", "HYALINECASTS" in the last 48 hours.    Invalid input(s): "WRIGHTSUR"      Significant Imaging: I have reviewed all pertinent imaging results/findings within the past 24 hours.      Assessment/Plan:      * Sepsis due to gram-negative UTI  This patient does have evidence of infective focus  My overall impression is sepsis.  Source: Respiratory and Urinary Tract  Antibiotics given-   Antibiotics (72h ago, onward)    Start     Stop Route Frequency Ordered    10/27/23 1100  levoFLOXacin 750 mg/150 mL IVPB 750 mg         -- IV Every 24 hours (non-standard times) 10/27/23 0957    10/26/23 0900  mupirocin 2 % ointment         10/31/23 0859 Nasl 2 times daily 10/26/23 0046        Latest lactate reviewed-  No results for input(s): "LACTATE" in the last 72 hours.  Organ dysfunction indicated by Acute kidney injury and Acute heart failure    Fluid challenge Patient recevied 1 L bolus in the ED.  Will give IV fluids.  Pleural effusions seen on CTAP   Post- resuscitation assessment No - Post resuscitation assessment not needed       Will Not start Pressors- Levophed for MAP of 65  Source control achieved by: IV levofloxacin   Blood CX pending and urine culture gram negative rods     Home on levaquin for 3 mored ays       Pulmonary hypertension  Seen on Echo   Moderate PHTN   Pulmonary effusion     Complicated urinary tract infection  U/A with 1+ leukocytes and 20 WBC on microscopic  Urine cultures gram negative rods   Continue IV levofloxacin , home on po     Pleural effusion  BNP mildly elevated   Seen on CTAP moderate left and small right pleural effusion with adjacent atelectasis   Gentle hydration but at high risk for increasing pulmonary edema/pleural " effusions      COVID-19  Patient is identified as positive for COVID   Initiate standard COVID protocols; COVID-19 testing Collection Date: 10/12/2023 Collection Time:  12:01 PM ,Infection Control notification  and isolation- respiratory, contact and droplet per protocol    Diagnostics: daily labs     Management: none at this time.  Pt is not hypoxic     Advance Care Planning  Patient is a DNR.  Lapost in chart     Acute kidney injury superimposed on chronic kidney disease  Patient with acute kidney injury/acute renal failure likely due to pre-renal azotemia due to dehydration JACKELIN is currently stable. Baseline creatinine 1.0 - Labs reviewed- Renal function/electrolytes with Estimated Creatinine Clearance: 61 mL/min (based on SCr of 1.1 mg/dL). according to latest data. Monitor urine output and serial BMP and adjust therapy as needed. Avoid nephrotoxins and renally dose meds for GFR listed above.    IV fluids.  In the setting of JACKELIN/UTI CT AP ordered and no renal stones seen.      Slowly improving     Back to baseline       Atrial fibrillation  Rate controlled  Continue eliquis     Acute pulmonary embolism  Continue home Eliquis       Type 2 diabetes mellitus with circulatory disorder  Patient's FSGs are controlled on current medication regimen.  Last A1c reviewed-   Lab Results   Component Value Date    HGBA1C 5.4 10/02/2023     Most recent fingerstick glucose reviewed-   Recent Labs   Lab 10/28/23  1119 10/28/23  1616 10/28/23  2022 10/29/23  0733   POCTGLUCOSE 137* 126* 148* 121*     Current correctional scale  Low  Maintain anti-hyperglycemic dose as follows-   Antihyperglycemics (From admission, onward)    Start     Stop Route Frequency Ordered    10/26/23 1315  insulin aspart U-100 pen 0-5 Units         -- SubQ Before meals & nightly PRN 10/26/23 1216        Hold Oral hypoglycemics while patient is in the hospital.      VTE Risk Mitigation (From admission, onward)         Ordered     apixaban tablet 5 mg  2  times daily         10/26/23 0859     IP VTE HIGH RISK PATIENT  Once         10/26/23 0045     Place sequential compression device  Until discontinued         10/26/23 0045                Discharge Planning   EDMOND: 10/28/2023     Code Status: DNR   Is the patient medically ready for discharge?:     Reason for patient still in hospital (select all that apply): Pending disposition  Discharge Plan A: Skilled Nursing Facility                  Danny Law MD  Department of Hospital Medicine   [place] [facility] Surg (3rd Fl)

## 2023-10-30 PROBLEM — D61.818 PANCYTOPENIA: Status: ACTIVE | Noted: 2023-10-30

## 2023-10-30 PROBLEM — A49.02 MRSA INFECTION: Status: ACTIVE | Noted: 2023-10-30

## 2023-10-30 LAB
ANION GAP SERPL CALC-SCNC: 8 MMOL/L (ref 8–16)
BACTERIA BLD CULT: NORMAL
BASOPHILS NFR BLD: 0 % (ref 0–1.9)
BUN SERPL-MCNC: 10 MG/DL (ref 8–23)
CALCIUM SERPL-MCNC: 7.3 MG/DL (ref 8.7–10.5)
CHLORIDE SERPL-SCNC: 112 MMOL/L (ref 95–110)
CO2 SERPL-SCNC: 19 MMOL/L (ref 23–29)
CREAT SERPL-MCNC: 1 MG/DL (ref 0.5–1.4)
DIFFERENTIAL METHOD: ABNORMAL
EOSINOPHIL NFR BLD: 0 % (ref 0–8)
ERYTHROCYTE [DISTWIDTH] IN BLOOD BY AUTOMATED COUNT: 13.5 % (ref 11.5–14.5)
EST. GFR  (NO RACE VARIABLE): >60 ML/MIN/1.73 M^2
FERRITIN SERPL-MCNC: 543 NG/ML (ref 20–300)
FOLATE SERPL-MCNC: 4.9 NG/ML (ref 4–24)
GLUCOSE SERPL-MCNC: 101 MG/DL (ref 70–110)
HCT VFR BLD AUTO: 25.8 % (ref 40–54)
HGB BLD-MCNC: 8.5 G/DL (ref 14–18)
IMM GRANULOCYTES # BLD AUTO: ABNORMAL K/UL (ref 0–0.04)
IMM GRANULOCYTES NFR BLD AUTO: ABNORMAL % (ref 0–0.5)
IRON SERPL-MCNC: 26 UG/DL (ref 45–160)
LYMPHOCYTES NFR BLD: 32 % (ref 18–48)
MCH RBC QN AUTO: 36 PG (ref 27–31)
MCHC RBC AUTO-ENTMCNC: 32.9 G/DL (ref 32–36)
MCV RBC AUTO: 109 FL (ref 82–98)
MONOCYTES NFR BLD: 5 % (ref 4–15)
NEUTROPHILS NFR BLD: 63 % (ref 38–73)
NRBC BLD-RTO: 0 /100 WBC
PLATELET # BLD AUTO: 122 K/UL (ref 150–450)
PMV BLD AUTO: 10.2 FL (ref 9.2–12.9)
POCT GLUCOSE: 109 MG/DL (ref 70–110)
POCT GLUCOSE: 110 MG/DL (ref 70–110)
POCT GLUCOSE: 116 MG/DL (ref 70–110)
POCT GLUCOSE: 99 MG/DL (ref 70–110)
POTASSIUM SERPL-SCNC: 3.5 MMOL/L (ref 3.5–5.1)
RBC # BLD AUTO: 2.36 M/UL (ref 4.6–6.2)
SATURATED IRON: 13 % (ref 20–50)
SODIUM SERPL-SCNC: 139 MMOL/L (ref 136–145)
TOTAL IRON BINDING CAPACITY: 194 UG/DL (ref 250–450)
TRANSFERRIN SERPL-MCNC: 131 MG/DL (ref 200–375)
TSH SERPL DL<=0.005 MIU/L-ACNC: 2.17 UIU/ML (ref 0.4–4)
VIT B12 SERPL-MCNC: 261 PG/ML (ref 210–950)
WBC # BLD AUTO: 1.77 K/UL (ref 3.9–12.7)

## 2023-10-30 PROCEDURE — 80048 BASIC METABOLIC PNL TOTAL CA: CPT | Performed by: FAMILY MEDICINE

## 2023-10-30 PROCEDURE — 99900035 HC TECH TIME PER 15 MIN (STAT)

## 2023-10-30 PROCEDURE — 25000003 PHARM REV CODE 250: Performed by: PHYSICIAN ASSISTANT

## 2023-10-30 PROCEDURE — 99233 SBSQ HOSP IP/OBS HIGH 50: CPT | Mod: ,,, | Performed by: PHYSICIAN ASSISTANT

## 2023-10-30 PROCEDURE — 25000003 PHARM REV CODE 250: Performed by: FAMILY MEDICINE

## 2023-10-30 PROCEDURE — 84466 ASSAY OF TRANSFERRIN: CPT | Performed by: PHYSICIAN ASSISTANT

## 2023-10-30 PROCEDURE — 97165 OT EVAL LOW COMPLEX 30 MIN: CPT

## 2023-10-30 PROCEDURE — 94761 N-INVAS EAR/PLS OXIMETRY MLT: CPT

## 2023-10-30 PROCEDURE — 25500020 PHARM REV CODE 255: Performed by: FAMILY MEDICINE

## 2023-10-30 PROCEDURE — 84443 ASSAY THYROID STIM HORMONE: CPT | Performed by: PHYSICIAN ASSISTANT

## 2023-10-30 PROCEDURE — 83540 ASSAY OF IRON: CPT | Performed by: PHYSICIAN ASSISTANT

## 2023-10-30 PROCEDURE — 99233 PR SUBSEQUENT HOSPITAL CARE,LEVL III: ICD-10-PCS | Mod: ,,, | Performed by: PHYSICIAN ASSISTANT

## 2023-10-30 PROCEDURE — 63600175 PHARM REV CODE 636 W HCPCS: Mod: JZ,TB | Performed by: PHYSICIAN ASSISTANT

## 2023-10-30 PROCEDURE — 85007 BL SMEAR W/DIFF WBC COUNT: CPT | Performed by: FAMILY MEDICINE

## 2023-10-30 PROCEDURE — 27000207 HC ISOLATION

## 2023-10-30 PROCEDURE — 99238 PR HOSPITAL DISCHARGE DAY,<30 MIN: ICD-10-PCS | Mod: ,,, | Performed by: INTERNAL MEDICINE

## 2023-10-30 PROCEDURE — 36415 COLL VENOUS BLD VENIPUNCTURE: CPT | Performed by: PHYSICIAN ASSISTANT

## 2023-10-30 PROCEDURE — 99238 HOSP IP/OBS DSCHRG MGMT 30/<: CPT | Mod: ,,, | Performed by: INTERNAL MEDICINE

## 2023-10-30 PROCEDURE — 82746 ASSAY OF FOLIC ACID SERUM: CPT | Performed by: PHYSICIAN ASSISTANT

## 2023-10-30 PROCEDURE — 11000001 HC ACUTE MED/SURG PRIVATE ROOM

## 2023-10-30 PROCEDURE — 85027 COMPLETE CBC AUTOMATED: CPT | Performed by: FAMILY MEDICINE

## 2023-10-30 PROCEDURE — 63600175 PHARM REV CODE 636 W HCPCS: Performed by: FAMILY MEDICINE

## 2023-10-30 PROCEDURE — 82607 VITAMIN B-12: CPT | Performed by: PHYSICIAN ASSISTANT

## 2023-10-30 PROCEDURE — 82728 ASSAY OF FERRITIN: CPT | Performed by: PHYSICIAN ASSISTANT

## 2023-10-30 PROCEDURE — 36415 COLL VENOUS BLD VENIPUNCTURE: CPT | Performed by: FAMILY MEDICINE

## 2023-10-30 RX ORDER — AMOXICILLIN AND CLAVULANATE POTASSIUM 500; 125 MG/1; MG/1
1 TABLET, FILM COATED ORAL 2 TIMES DAILY
Qty: 10 TABLET | Refills: 0 | Status: ON HOLD | OUTPATIENT
Start: 2023-10-30 | End: 2023-11-16 | Stop reason: HOSPADM

## 2023-10-30 RX ORDER — L. ACIDOPHILUS/L.BULGARICUS 100MM CELL
GRANULES IN PACKET (EA) ORAL DAILY
Status: DISCONTINUED | OUTPATIENT
Start: 2023-10-30 | End: 2023-10-31 | Stop reason: HOSPADM

## 2023-10-30 RX ORDER — AMOXICILLIN AND CLAVULANATE POTASSIUM 500; 125 MG/1; MG/1
1 TABLET, FILM COATED ORAL 2 TIMES DAILY
Status: DISCONTINUED | OUTPATIENT
Start: 2023-10-30 | End: 2023-10-31 | Stop reason: HOSPADM

## 2023-10-30 RX ADMIN — APIXABAN 5 MG: 5 TABLET, FILM COATED ORAL at 10:10

## 2023-10-30 RX ADMIN — HYDROCODONE BITARTRATE AND ACETAMINOPHEN 1 TABLET: 10; 325 TABLET ORAL at 04:10

## 2023-10-30 RX ADMIN — ATORVASTATIN CALCIUM 40 MG: 40 TABLET, FILM COATED ORAL at 08:10

## 2023-10-30 RX ADMIN — HYDROCODONE BITARTRATE AND ACETAMINOPHEN 1 TABLET: 10; 325 TABLET ORAL at 10:10

## 2023-10-30 RX ADMIN — AMOXICILLIN AND CLAVULANATE POTASSIUM 500 MG: 500; 125 TABLET, FILM COATED ORAL at 08:10

## 2023-10-30 RX ADMIN — LACTOBACILLUS ACIDOPHILUS / LACTOBACILLUS BULGARICUS 1 EACH: 100 MILLION CFU STRENGTH GRANULES at 10:10

## 2023-10-30 RX ADMIN — PANTOPRAZOLE SODIUM 40 MG: 40 TABLET, DELAYED RELEASE ORAL at 10:10

## 2023-10-30 RX ADMIN — GABAPENTIN 100 MG: 100 CAPSULE ORAL at 08:10

## 2023-10-30 RX ADMIN — FILGRASTIM 300 MCG: 300 INJECTION, SOLUTION INTRAVENOUS; SUBCUTANEOUS at 10:10

## 2023-10-30 RX ADMIN — MUPIROCIN: 20 OINTMENT TOPICAL at 08:10

## 2023-10-30 RX ADMIN — MUPIROCIN: 20 OINTMENT TOPICAL at 10:10

## 2023-10-30 RX ADMIN — VANCOMYCIN HYDROCHLORIDE 1250 MG: 1.25 INJECTION, POWDER, LYOPHILIZED, FOR SOLUTION INTRAVENOUS at 06:10

## 2023-10-30 RX ADMIN — VANCOMYCIN HYDROCHLORIDE 1000 MG: 1 INJECTION, POWDER, LYOPHILIZED, FOR SOLUTION INTRAVENOUS at 08:10

## 2023-10-30 RX ADMIN — APIXABAN 5 MG: 5 TABLET, FILM COATED ORAL at 08:10

## 2023-10-30 RX ADMIN — IOHEXOL 100 ML: 350 INJECTION, SOLUTION INTRAVENOUS at 08:10

## 2023-10-30 RX ADMIN — AMOXICILLIN AND CLAVULANATE POTASSIUM 500 MG: 500; 125 TABLET, FILM COATED ORAL at 10:10

## 2023-10-30 RX ADMIN — SODIUM CHLORIDE: 9 INJECTION, SOLUTION INTRAVENOUS at 07:10

## 2023-10-30 NOTE — PT/OT/SLP EVAL
"Occupational Therapy   Evaluation    Name: Debbie Zepeda  MRN: [id number]  Admitting Diagnosis: Sepsis due to gram-negative UTI  Recent Surgery: * No surgery found *      Recommendations:     Discharge Recommendations: Moderate Intensity Therapy (Return to SNF)  Discharge Equipment Recommendations:   (Will continue to assess.)  Barriers to discharge:  None (SNF resident)    Assessment:     Debbie Zepeda is a 88 y.o. male with a medical diagnosis of Sepsis due to gram-negative UTI.  He presents with performance deficits affecting function: weakness, impaired endurance, decreased upper extremity function, decreased lower extremity function, pain, decreased ROM, impaired fine motor, edema, impaired skin. Pt refusing mobilization today due to right upper extremity pain and edema, agreeing to range of motion and bed level ADL to assess bilateral upper extremity function.    Rehab Prognosis: Fair; patient would benefit from acute skilled OT services to address these deficits and reach maximum level of function.    Plan:     Patient to be seen 5 x/week to address the above listed problems via self-care/home management, therapeutic activities, therapeutic exercises  Plan of Care Expires: 11/13/23  Plan of Care Reviewed with: patient    Subjective     Chief Complaint: "My right arm hurts and is swollen. It just keeps getting worse."  Patient/Family Comments/goals: None stated    Occupational Profile:  Living Environment: NH resident  Previous level of function: Pt reports wheelchair use for mobility with MIN A > MOD A feeding, MAX A bathing, and MAX A dressing.  Equipment Used at Home: wheelchair, hospital bed, walker, rolling  Assistance upon Discharge: NH staff    Pain/Comfort:  Pain Rating 1:  (Did not quantify)  Location - Side 1: Right  Location - Orientation 1: upper  Location 1: arm  Pain Addressed 1: Reposition  Pain Rating Post-Intervention 1:  (Did not quantify)    Patients cultural, spiritual, Jew conflicts " given the current situation: no    Objective:     Communicated with: Nursing prior to session.  Patient found HOB elevated with peripheral IV, lugo catheter, telemetry, SCD upon OT entry to room.    General Precautions: Standard, contact, fall, droplet, airborne  Orthopedic Precautions: N/A  Braces: N/A  Respiratory Status: Room air    Occupational Performance:    Bed Mobility:    Refused to participate    Functional Mobility/Transfers:  Refused to participate.    Activities of Daily Living:  Grooming: minimum assistance and moderate assistance for feeding and oral care with use of left upper extremity with assist to open containers.  Toileting: total assistance with lugo.    Cognitive/Visual Perceptual:  Cognitive/Psychosocial Skills:     -       Oriented to: Person, Place, and Situation   -       Follows Commands/attention:Follows two-step commands  -       Communication: Pt with some difficulty word finding and slow responses.    Physical Exam:  Skin integrity: Bruising of bilateral upper extremity on volar and dorsal forearms, Wound on radial side of left hand at wrist and 1st phalanx, dry/flaky skin to right upper extremity , and Dry  Edema:  Severe noted to right upper extremity digits, hand, forearm, and elbow and at left elbow.  Upper Extremity Range of Motion:     -       Right Upper Extremity: Shoulder and elbow range WFL, some limitation in wrist movement due to some pain.  -       Left Upper Extremity: WFL  Upper Extremity Strength:    -       Right Upper Extremity: Shoulder flexion 4+/5 with impaired elbow and wrist strength due to pain.  -       Left Upper Extremity: WFL   Strength:    -       Right Upper Extremity: WFL  -       Left Upper Extremity: Deficits: reduced  noted -3/5 with increased swelling and pain noted throughout digits.   Fine Motor Coordination:    -       Impaired  Right hand thumb/finger opposition skills reduced speed noted with inability to oppose to 5th digit due to  swelling.     AMPAC 6 Click ADL:  AMPAC Total Score: 10    Treatment & Education:  OT evaluation completed. Pt educated on POC and role of OT.    Patient left HOB elevated with all lines intact, call button in reach, and bed alarm on    GOALS:   Multidisciplinary Problems       Occupational Therapy Goals          Problem: Occupational Therapy    Goal Priority Disciplines Outcome Interventions   Occupational Therapy Goal     OT, PT/OT     Description: Pt to perform UB dressing with MOD A seated EOB.  Pt to perform self toileting with MOD A using BSC.  Pt to perform level functional transfers required for ADL's with CGA, RW level.  Pt to demonstrate Fair+ dynamic seated balance as required to perform ADL's from standing level.  Pt to demonstrate Fair+ BUE strength during functional task   Pt to participate in seated EOB ADL activity for increased activity tolerance and safety upon discharge.                         History:     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         Past Surgical History:   Procedure Laterality Date    CHOLECYSTECTOMY      INCISION AND DRAINAGE OF HAND Right 10/6/2023    Procedure: INCISION AND DRAINAGE, HAND, IRRIGATION AND DRAINAGE;  Surgeon: Peter Silvestre MD;  Location: [facility] OR;  Service: Orthopedics;  Laterality: Right;  hand  local requested per the family  7am per Mahsa    INSERTION OF PACEMAKER N/A 1/14/2019    Procedure: INSERTION, PACEMAKER;  Surgeon: Sabino Galicia MD;  Location: [facility] CATH;  Service: Cardiology;  Laterality: N/A;       Time Tracking:     OT Date of Treatment:    OT Start Time: 1345  OT Stop Time: 1400  OT Total Time (min): 15 min    Billable Minutes:Evaluation 15    10/30/2023

## 2023-10-30 NOTE — NURSING
Ultrasound results back, Dr. Resendiz notified. Dr. Resendiz stating pt may need to be possibly transferred to different facility. Transfer initiated.

## 2023-10-30 NOTE — ASSESSMENT & PLAN NOTE
Patient with acute kidney injury/acute renal failure likely due to pre-renal azotemia due to dehydration JACKELIN is currently stable. Baseline creatinine 1.0 - Labs reviewed- Renal function/electrolytes with Estimated Creatinine Clearance: 67.1 mL/min (based on SCr of 1 mg/dL). according to latest data. Monitor urine output and serial BMP and adjust therapy as needed. Avoid nephrotoxins and renally dose meds for GFR listed above.    IV fluids.  In the setting of JACKELIN/UTI CT AP ordered and no renal stones seen.      Slowly improving     Back to baseline

## 2023-10-30 NOTE — PT/OT/SLP PROGRESS
Physical Therapy      Patient Name:  Debbie Zepeda   MRN:  [id number]    Patient not seen today secondary to Other (Comment) Attempted to see patient with daughter at bedside. Daughter refused PT tx today due to right UE swelling. Nursing made aware. Will follow-up tomorrow.

## 2023-10-30 NOTE — ASSESSMENT & PLAN NOTE
"This patient does have evidence of infective focus  My overall impression is sepsis.  Source: Respiratory and Urinary Tract  Antibiotics given-   Antibiotics (72h ago, onward)    Start     Stop Route Frequency Ordered    10/30/23 1000  amoxicillin-clavulanate 500-125mg per tablet 500 mg         11/02/23 0859 Oral 2 times daily 10/30/23 0859    10/26/23 0900  mupirocin 2 % ointment         10/31/23 0859 Nasl 2 times daily 10/26/23 0046        Latest lactate reviewed-  No results for input(s): "LACTATE" in the last 72 hours.  Organ dysfunction indicated by Acute kidney injury and Acute heart failure    Fluid challenge Patient recevied 1 L bolus in the ED.  Will give IV fluids.  Pleural effusions seen on CTAP   Post- resuscitation assessment No - Post resuscitation assessment not needed       Will Not start Pressors- Levophed for MAP of 65  Source control achieved by: IV levofloxacin   Blood CX pending and urine culture gram negative rods     D/c levofloxacin and started augmentin     "

## 2023-10-30 NOTE — PLAN OF CARE
10/30/23 0915   Rounds   Attendance Nurse ;Provider;Assigned nurse   Discharge Plan A Skilled Nursing Facility   Why the patient remains in the hospital Requires continued medical care   Transition of Care Barriers None         CM rounded with MACIEJ Sandoval and patient nurse Jumana today. Family at bedside and voiced concerns about care patient is receiving here. Specifically about patient right arm, as is is worse today. CM spoke with patient and family for a long time, and empathized with them about their frustration.   All frustrations voiced to MACIEJ Sandoval as well.   CM provided family with contact information, and emphasized for them to call with any questions/concerns.

## 2023-10-30 NOTE — PROGRESS NOTES
[place] [facility] Surg ([facility])  [facility] Medicine  Progress Note    Patient Name: Debbie Zepeda  MRN: [id number]  Patient Class: IP- Inpatient   Admission Date: 10/25/2023  Length of Stay: 5 days  Attending Physician: Barron Garcia MD  Primary Care Provider: Nigel Webb MD        Subjective:     Principal Problem:Sepsis due to gram-negative UTI        HPI:  Patient is an 88 year old male with medical history of HTN, HLD, PAF, CAD, HF EF45%, DM type 2, CKD stage 3 and prior stroke who was admitted to ED for urosepsis.  He was sent from nursing home with weakness, fever and hypoxia.  Patient denies any acute complaints such as cough, CP, SOB, nausea, vomiting dysuria and urinary retention.  Additionally there has been an outbreak of covid at NH and patient tested positive.        Admitted for urosepsis.  H/o neurogenic bladder and urinary retention.        Overview/Hospital Course:  PT admitted yesterday for urosepsis.  BP on lower side.  Gentle hydration.  JACKELIN mildly improving.  Will continue to monitor fluid status.  He is at high risk for fluid overload due to pulmonary HTN.  Additionally pleural effusions seen on CXR.      Urine culture gram negative rods.  Continue levofloxacin.      Urine is nearly pan sensitive. Will go home today on flouroquinolone ]      10/29  D/c home yesterday but NH denied to take him. Can't take a skilled pt on the weekends is what I was told. Should go home tomorrow  No issues overnight   AFVSS     10/30 PT bp on lower side still.  Pancytopenia.  Will d/c levofloxacin and switch to augmentin for 3 more days.  H/H on lower side and will trend.  RUE with continued edema.  RUQ u/s ordered.  General surgery and wound consult placed.        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       Past Surgical History:    Procedure Laterality Date    CHOLECYSTECTOMY      INCISION AND DRAINAGE OF HAND Right 10/6/2023    Procedure: INCISION AND DRAINAGE, HAND, IRRIGATION AND DRAINAGE;  Surgeon: Peter Silvestre MD;  Location: [facility] OR;  Service: Orthopedics;  Laterality: Right;  hand  local requested per the family  7am per Mahsa    INSERTION OF PACEMAKER N/A 1/14/2019    Procedure: INSERTION, PACEMAKER;  Surgeon: Sabino Galicia MD;  Location: [facility] CATH;  Service: Cardiology;  Laterality: N/A;       Review of patient's allergies indicates:  No Known Allergies    No current facility-administered medications on file prior to encounter.     Current Outpatient Medications on File Prior to Encounter   Medication Sig    apixaban (ELIQUIS) 5 mg Tab Take 1 tablet (5 mg total) by mouth 2 (two) times daily.    ascorbic acid, vitamin C, (VITAMIN C) 500 MG tablet Take 500 mg by mouth once daily.    atorvastatin (LIPITOR) 40 MG tablet Take 40 mg by mouth every evening.    gabapentin (NEURONTIN) 100 MG capsule Take 1 capsule (100 mg total) by mouth every evening.    HYDROcodone-acetaminophen (NORCO)  mg per tablet Take 1 tablet by mouth every 6 (six) hours as needed for Pain.    multivitamin (ONE DAILY MULTIVITAMIN) per tablet Take 1 tablet by mouth once daily.    omeprazole (PRILOSEC) 20 MG capsule Take 20 mg by mouth once daily.    ondansetron (ZOFRAN) 4 MG tablet Take 4 mg by mouth every 8 (eight) hours as needed for Nausea.    potassium chloride (KLOR-CON) 10 MEQ TbSR Take 1 tablet (10 mEq total) by mouth every other day.    thiamine 100 MG tablet Take 100 mg by mouth once daily.    vitamin D (VITAMIN D3) 1000 units Tab Take 1,000 Units by mouth once daily.    zinc gluconate 50 mg tablet Take 50 mg by mouth once daily.    acetaminophen (TYLENOL) 650 MG Supp Place 1 suppository (650 mg total) rectally every 4 (four) hours as needed.     Family History    None       Tobacco Use    Smoking status: Never    Smokeless tobacco: Never    Substance and Sexual Activity    Alcohol use: Yes     Comment: 2-3 Highball per night.    Drug use: No    Sexual activity: Not on file     Review of Systems   Constitutional:  Negative for chills and fever.   HENT:  Negative for ear discharge and ear pain.    Eyes:  Negative for pain and discharge.   Respiratory:  Negative for cough and shortness of breath.    Cardiovascular:  Negative for chest pain and leg swelling.   Gastrointestinal:  Negative for nausea and vomiting.   Endocrine: Negative for cold intolerance and heat intolerance.   Genitourinary:  Negative for difficulty urinating and dysuria.   Musculoskeletal:  Negative for joint swelling and myalgias.   Skin:  Negative for rash and wound.   Neurological:  Negative for dizziness and headaches.   Psychiatric/Behavioral:  Negative for agitation and confusion.      Objective:     Vital Signs (Most Recent):  Temp: 98.4 °F (36.9 °C) (10/30/23 1135)  Pulse: (!) 59 (10/30/23 1200)  Resp: 20 (10/30/23 1135)  BP: (!) 100/54 (10/30/23 1135)  SpO2: 96 % (10/30/23 1135) Vital Signs (24h Range):  Temp:  [96.2 °F (35.7 °C)-99 °F (37.2 °C)] 98.4 °F (36.9 °C)  Pulse:  [57-67] 59  Resp:  [16-21] 20  SpO2:  [93 %-97 %] 96 %  BP: (100-120)/(54-61) 100/54     Weight: 100.1 kg (220 lb 10.9 oz)  Body mass index is 28.33 kg/m².     Physical Exam  Constitutional:       General: He is not in acute distress.  HENT:      Head: Normocephalic and atraumatic.   Eyes:      General:         Right eye: No discharge.         Left eye: No discharge.   Cardiovascular:      Rate and Rhythm: Normal rate. Rhythm irregular.   Pulmonary:      Effort: Pulmonary effort is normal.      Breath sounds: Normal breath sounds.   Abdominal:      General: There is no distension.      Tenderness: There is no abdominal tenderness.   Musculoskeletal:         General: Swelling (RUE) present. No tenderness.      Cervical back: Neck supple. No tenderness.      Right lower leg: Edema (+1) present.      Left lower  "leg: Edema (+1) present.   Skin:     General: Skin is warm and dry.      Findings: Bruising (right upper arm) present.   Neurological:      General: No focal deficit present.      Mental Status: He is alert and oriented to person, place, and time.      Comments: Patient's speech is delayed but eventually answers.     Psychiatric:         Mood and Affect: Mood normal.         Behavior: Behavior normal.                Significant Labs: A1C:   Recent Labs   Lab 10/02/23  1455   HGBA1C 5.4       ABGs: No results for input(s): "PH", "PCO2", "HCO3", "POCSATURATED", "BE", "TOTALHB", "COHB", "METHB", "O2HB", "POCFIO2", "PO2" in the last 48 hours.  Bilirubin:   Recent Labs   Lab 10/02/23  1430 10/03/23  0536 10/25/23  1524 10/26/23  1305   BILITOT 3.9* 2.5* 0.7 0.6       Blood Culture:   No results for input(s): "LABBLOO" in the last 48 hours.    BMP:   Recent Labs   Lab 10/30/23  0555         K 3.5   *   CO2 19*   BUN 10   CREATININE 1.0   CALCIUM 7.3*       CBC:   Recent Labs   Lab 10/30/23  0555   WBC 1.77*   HGB 8.5*   HCT 25.8*   *       CMP:   Recent Labs   Lab 10/30/23  0555      K 3.5   *   CO2 19*      BUN 10   CREATININE 1.0   CALCIUM 7.3*   ANIONGAP 8       Cardiac Markers:   No results for input(s): "CKMB", "MYOGLOBIN", "BNP", "TROPISTAT" in the last 48 hours.    Coagulation: No results for input(s): "PT", "INR", "APTT" in the last 48 hours.  Lactic Acid:   No results for input(s): "LACTATE" in the last 48 hours.    Lipase: No results for input(s): "LIPASE" in the last 48 hours.  Lipid Panel: No results for input(s): "CHOL", "HDL", "LDLCALC", "TRIG", "CHOLHDL" in the last 48 hours.  Magnesium: No results for input(s): "MG" in the last 48 hours.  POCT Glucose:   Recent Labs   Lab 10/29/23  2009 10/30/23  0737 10/30/23  1137   POCTGLUCOSE 124* 116* 110       Prealbumin: No results for input(s): "PREALBUMIN" in the last 48 hours.  Respiratory Culture: No results for " "input(s): "GSRESP", "RESPIRATORYC" in the last 48 hours.  Troponin:   No results for input(s): "TROPONINI", "TROPONINIHS" in the last 48 hours.    TSH:   Recent Labs   Lab 10/30/23  0838   TSH 2.165     Urine Culture: No results for input(s): "LABURIN" in the last 48 hours.    Urine Studies:   No results for input(s): "COLORU", "APPEARANCEUA", "PHUR", "SPECGRAV", "PROTEINUA", "GLUCUA", "KETONESU", "BILIRUBINUA", "OCCULTUA", "NITRITE", "UROBILINOGEN", "LEUKOCYTESUR", "RBCUA", "WBCUA", "BACTERIA", "SQUAMEPITHEL", "HYALINECASTS" in the last 48 hours.    Invalid input(s): "WRIGHTSUR"      Significant Imaging: I have reviewed all pertinent imaging results/findings within the past 24 hours.      Assessment/Plan:      * Sepsis due to gram-negative UTI  This patient does have evidence of infective focus  My overall impression is sepsis.  Source: Respiratory and Urinary Tract  Antibiotics given-   Antibiotics (72h ago, onward)      Start     Stop Route Frequency Ordered    10/30/23 1000  amoxicillin-clavulanate 500-125mg per tablet 500 mg         11/02/23 0859 Oral 2 times daily 10/30/23 0859    10/26/23 0900  mupirocin 2 % ointment         10/31/23 0859 Nasl 2 times daily 10/26/23 0046          Latest lactate reviewed-  No results for input(s): "LACTATE" in the last 72 hours.  Organ dysfunction indicated by Acute kidney injury and Acute heart failure    Fluid challenge Patient recevied 1 L bolus in the ED.  Will give IV fluids.  Pleural effusions seen on CTAP   Post- resuscitation assessment No - Post resuscitation assessment not needed       Will Not start Pressors- Levophed for MAP of 65  Source control achieved by: IV levofloxacin   Blood CX pending and urine culture gram negative rods     D/c levofloxacin and started augmentin       Pancytopenia  Possible drug induced from levofloxacin.  Neupogen ordered.    Recent Labs   Lab 10/30/23  0555   HGB 8.5*   WBC 1.77*   *     Right upper extremity edema   Recent MRSA " infection to right hand  Edema and bruising present  Soft tissue u/s ordered  Wound care pending  General surgery consulted     Pulmonary hypertension  Seen on Echo   Moderate PHTN   Pulmonary effusion     Complicated urinary tract infection  U/A with 1+ leukocytes and 20 WBC on microscopic  Urine cultures klebsiella pneumoniae  Continue IV levofloxacin , home on po     10/30: D/c IV levofloxacin and start Augmentin.  Urine culture klebsiella pneumoniae pansensitive     Pleural effusion  BNP mildly elevated   Seen on CTAP moderate left and small right pleural effusion with adjacent atelectasis   Gentle hydration but at high risk for increasing pulmonary edema/pleural effusions      COVID-19  Patient is identified as positive for COVID   Initiate standard COVID protocols; COVID-19 testing Collection Date: 10/12/2023 Collection Time:  12:01 PM ,Infection Control notification  and isolation- respiratory, contact and droplet per protocol    Diagnostics: daily labs     Management: none at this time.  Pt is not hypoxic     Advance Care Planning  Patient is a DNR.  Lapost in chart     Acute kidney injury superimposed on chronic kidney disease  Patient with acute kidney injury/acute renal failure likely due to pre-renal azotemia due to dehydration JACKELIN is currently stable. Baseline creatinine 1.0 - Labs reviewed- Renal function/electrolytes with Estimated Creatinine Clearance: 67.1 mL/min (based on SCr of 1 mg/dL). according to latest data. Monitor urine output and serial BMP and adjust therapy as needed. Avoid nephrotoxins and renally dose meds for GFR listed above.    IV fluids.  In the setting of JACKELIN/UTI CT AP ordered and no renal stones seen.      Slowly improving     Back to baseline       Atrial fibrillation  Rate controlled  Continue eliquis     Macrocytic anemia    Current CBC reviewed-   Lab Results   Component Value Date    HGB 8.5 (L) 10/30/2023    HCT 25.8 (L) 10/30/2023     Monitor serial CBC and transfuse if  patient becomes hemodynamically unstable, symptomatic or H/H drops below 8.    Not far from baseline.  Anemia work up pending.      Acute pulmonary embolism  Continue home Eliquis       Type 2 diabetes mellitus with circulatory disorder  Patient's FSGs are controlled on current medication regimen.  Last A1c reviewed-   Lab Results   Component Value Date    HGBA1C 5.4 10/02/2023     Most recent fingerstick glucose reviewed-   Recent Labs   Lab 10/29/23  1630 10/29/23  2009 10/30/23  0737 10/30/23  1137   POCTGLUCOSE 114* 124* 116* 110     Current correctional scale  Low  Maintain anti-hyperglycemic dose as follows-   Antihyperglycemics (From admission, onward)      Start     Stop Route Frequency Ordered    10/26/23 1315  insulin aspart U-100 pen 0-5 Units         -- SubQ Before meals & nightly PRN 10/26/23 1216          Hold Oral hypoglycemics while patient is in the hospital.      VTE Risk Mitigation (From admission, onward)           Ordered     apixaban tablet 5 mg  2 times daily         10/26/23 0859     IP VTE HIGH RISK PATIENT  Once         10/26/23 0045     Place sequential compression device  Until discontinued         10/26/23 0045                    Discharge Planning   EDMOND: 10/28/2023     Code Status: DNR   Is the patient medically ready for discharge?:     Reason for patient still in hospital (select all that apply): Patient trending condition  Discharge Plan A: Skilled Nursing Facility                  Lori Long PA-C  Department of Hospital Medicine   [place] - [facility] Surg (3rd Fl)

## 2023-10-30 NOTE — CONSULTS
Consult Note    Consult Requested by:  1 care  Reason for Consult:  hand wound  SUBJECTIVE:     History of Present Illness:  Patient is a 88 y.o. male presents with gram neg urosepsis. Has been in hospital for several days and has covid. Asked to give recommendations for hand wound care. Pt had I/d for tenosynovitis on 10/6/23 by ortho., Pictures sent to me by house supervisor. No sign of active infection but exposed tendon.    Review of patient's allergies indicates:  No Known Allergies    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     Past Surgical History:   Procedure Laterality Date    CHOLECYSTECTOMY      INCISION AND DRAINAGE OF HAND Right 10/6/2023    Procedure: INCISION AND DRAINAGE, HAND, IRRIGATION AND DRAINAGE;  Surgeon: Peter Silvestre MD;  Location: [facility] OR;  Service: Orthopedics;  Laterality: Right;  hand  local requested per the family  7am per Mahsa    INSERTION OF PACEMAKER N/A 1/14/2019    Procedure: INSERTION, PACEMAKER;  Surgeon: Sabino Galicia MD;  Location: [facility] CATH;  Service: Cardiology;  Laterality: N/A;     No family history on file.  Social History     Tobacco Use    Smoking status: Never    Smokeless tobacco: Never   Substance Use Topics    Alcohol use: Yes     Comment: 2-3 Highball per night.    Drug use: No       Review of Systems:      OBJECTIVE:     Vital Signs:  Temp:  [96.2 °F (35.7 °C)-98.9 °F (37.2 °C)]   Pulse:  [57-63]   Resp:  [19-21]   BP: (100-120)/(54-57)   SpO2:  [93 %-97 %]     Physical Exam:  Open wound of right hand.No redness marked soft tissue swelling    Laboratory:  Labs reviewed WBC below nl    Diagnostic Results:  Reviewed    ASSESSMENT/PLAN:   Impression tenosynovitis of rt hand post op open wound    Plan: Recommend silver product and change and clean daily. Needs f/u with ortho as I do not do  hand surgery.

## 2023-10-30 NOTE — ASSESSMENT & PLAN NOTE
Current CBC reviewed-   Lab Results   Component Value Date    HGB 8.5 (L) 10/30/2023    HCT 25.8 (L) 10/30/2023     Monitor serial CBC and transfuse if patient becomes hemodynamically unstable, symptomatic or H/H drops below 8.    Not far from baseline.  Anemia work up pending.

## 2023-10-30 NOTE — PLAN OF CARE
Problem: Infection  Goal: Absence of Infection Signs and Symptoms  Outcome: Ongoing, Progressing     Problem: Adult Inpatient Plan of Care  Goal: Plan of Care Review  Outcome: Ongoing, Progressing  Flowsheets (Taken 10/30/2023 7572)  Plan of Care Reviewed With:   patient   daughter     Problem: Diabetes Comorbidity  Goal: Blood Glucose Level Within Targeted Range  Outcome: Ongoing, Progressing     Problem: Impaired Wound Healing  Goal: Optimal Wound Healing  Outcome: Ongoing, Progressing     Problem: Fall Injury Risk  Goal: Absence of Fall and Fall-Related Injury  Outcome: Ongoing, Progressing     Problem: Skin Injury Risk Increased  Goal: Skin Health and Integrity  Outcome: Ongoing, Progressing     Problem: UTI (Urinary Tract Infection)  Goal: Improved Infection Symptoms  Outcome: Ongoing, Progressing     Problem: Pain Acute  Goal: Acceptable Pain Control and Functional Ability  Outcome: Ongoing, Progressing

## 2023-10-30 NOTE — SUBJECTIVE & OBJECTIVE
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       Past Surgical History:   Procedure Laterality Date    CHOLECYSTECTOMY      INCISION AND DRAINAGE OF HAND Right 10/6/2023    Procedure: INCISION AND DRAINAGE, HAND, IRRIGATION AND DRAINAGE;  Surgeon: Peter Silvestre MD;  Location: [facility] OR;  Service: Orthopedics;  Laterality: Right;  hand  local requested per the family  7am per Mahsa    INSERTION OF PACEMAKER N/A 1/14/2019    Procedure: INSERTION, PACEMAKER;  Surgeon: Sabino Galicia MD;  Location: [facility] CATH;  Service: Cardiology;  Laterality: N/A;       Review of patient's allergies indicates:  No Known Allergies    No current facility-administered medications on file prior to encounter.     Current Outpatient Medications on File Prior to Encounter   Medication Sig    apixaban (ELIQUIS) 5 mg Tab Take 1 tablet (5 mg total) by mouth 2 (two) times daily.    ascorbic acid, vitamin C, (VITAMIN C) 500 MG tablet Take 500 mg by mouth once daily.    atorvastatin (LIPITOR) 40 MG tablet Take 40 mg by mouth every evening.    gabapentin (NEURONTIN) 100 MG capsule Take 1 capsule (100 mg total) by mouth every evening.    HYDROcodone-acetaminophen (NORCO)  mg per tablet Take 1 tablet by mouth every 6 (six) hours as needed for Pain.    multivitamin (ONE DAILY MULTIVITAMIN) per tablet Take 1 tablet by mouth once daily.    omeprazole (PRILOSEC) 20 MG capsule Take 20 mg by mouth once daily.    ondansetron (ZOFRAN) 4 MG tablet Take 4 mg by mouth every 8 (eight) hours as needed for Nausea.    potassium chloride (KLOR-CON) 10 MEQ TbSR Take 1 tablet (10 mEq total) by mouth every other day.    thiamine 100 MG tablet Take 100 mg by mouth once daily.    vitamin D (VITAMIN D3) 1000 units Tab Take 1,000 Units by mouth once daily.    zinc gluconate 50 mg tablet Take 50 mg  by mouth once daily.    acetaminophen (TYLENOL) 650 MG Supp Place 1 suppository (650 mg total) rectally every 4 (four) hours as needed.     Family History    None       Tobacco Use    Smoking status: Never    Smokeless tobacco: Never   Substance and Sexual Activity    Alcohol use: Yes     Comment: 2-3 Highball per night.    Drug use: No    Sexual activity: Not on file     Review of Systems   Constitutional:  Negative for chills and fever.   HENT:  Negative for ear discharge and ear pain.    Eyes:  Negative for pain and discharge.   Respiratory:  Negative for cough and shortness of breath.    Cardiovascular:  Negative for chest pain and leg swelling.   Gastrointestinal:  Negative for nausea and vomiting.   Endocrine: Negative for cold intolerance and heat intolerance.   Genitourinary:  Negative for difficulty urinating and dysuria.   Musculoskeletal:  Negative for joint swelling and myalgias.   Skin:  Negative for rash and wound.   Neurological:  Negative for dizziness and headaches.   Psychiatric/Behavioral:  Negative for agitation and confusion.      Objective:     Vital Signs (Most Recent):  Temp: 98.4 °F (36.9 °C) (10/30/23 1135)  Pulse: (!) 59 (10/30/23 1200)  Resp: 20 (10/30/23 1135)  BP: (!) 100/54 (10/30/23 1135)  SpO2: 96 % (10/30/23 1135) Vital Signs (24h Range):  Temp:  [96.2 °F (35.7 °C)-99 °F (37.2 °C)] 98.4 °F (36.9 °C)  Pulse:  [57-67] 59  Resp:  [16-21] 20  SpO2:  [93 %-97 %] 96 %  BP: (100-120)/(54-61) 100/54     Weight: 100.1 kg (220 lb 10.9 oz)  Body mass index is 28.33 kg/m².     Physical Exam  Constitutional:       General: He is not in acute distress.  HENT:      Head: Normocephalic and atraumatic.   Eyes:      General:         Right eye: No discharge.         Left eye: No discharge.   Cardiovascular:      Rate and Rhythm: Normal rate. Rhythm irregular.   Pulmonary:      Effort: Pulmonary effort is normal.      Breath sounds: Normal breath sounds.   Abdominal:      General: There is no  "distension.      Tenderness: There is no abdominal tenderness.   Musculoskeletal:         General: Swelling (RUE) present. No tenderness.      Cervical back: Neck supple. No tenderness.      Right lower leg: Edema (+1) present.      Left lower leg: Edema (+1) present.   Skin:     General: Skin is warm and dry.      Findings: Bruising (right upper arm) present.   Neurological:      General: No focal deficit present.      Mental Status: He is alert and oriented to person, place, and time.      Comments: Patient's speech is delayed but eventually answers.     Psychiatric:         Mood and Affect: Mood normal.         Behavior: Behavior normal.                Significant Labs: A1C:   Recent Labs   Lab 10/02/23  1455   HGBA1C 5.4       ABGs: No results for input(s): "PH", "PCO2", "HCO3", "POCSATURATED", "BE", "TOTALHB", "COHB", "METHB", "O2HB", "POCFIO2", "PO2" in the last 48 hours.  Bilirubin:   Recent Labs   Lab 10/02/23  1430 10/03/23  0536 10/25/23  1524 10/26/23  1305   BILITOT 3.9* 2.5* 0.7 0.6       Blood Culture:   No results for input(s): "LABBLOO" in the last 48 hours.    BMP:   Recent Labs   Lab 10/30/23  0555         K 3.5   *   CO2 19*   BUN 10   CREATININE 1.0   CALCIUM 7.3*       CBC:   Recent Labs   Lab 10/30/23  0555   WBC 1.77*   HGB 8.5*   HCT 25.8*   *       CMP:   Recent Labs   Lab 10/30/23  0555      K 3.5   *   CO2 19*      BUN 10   CREATININE 1.0   CALCIUM 7.3*   ANIONGAP 8       Cardiac Markers:   No results for input(s): "CKMB", "MYOGLOBIN", "BNP", "TROPISTAT" in the last 48 hours.    Coagulation: No results for input(s): "PT", "INR", "APTT" in the last 48 hours.  Lactic Acid:   No results for input(s): "LACTATE" in the last 48 hours.    Lipase: No results for input(s): "LIPASE" in the last 48 hours.  Lipid Panel: No results for input(s): "CHOL", "HDL", "LDLCALC", "TRIG", "CHOLHDL" in the last 48 hours.  Magnesium: No results for input(s): "MG" in the " "last 48 hours.  POCT Glucose:   Recent Labs   Lab 10/29/23  2009 10/30/23  0737 10/30/23  1137   POCTGLUCOSE 124* 116* 110       Prealbumin: No results for input(s): "PREALBUMIN" in the last 48 hours.  Respiratory Culture: No results for input(s): "GSRESP", "RESPIRATORYC" in the last 48 hours.  Troponin:   No results for input(s): "TROPONINI", "TROPONINIHS" in the last 48 hours.    TSH:   Recent Labs   Lab 10/30/23  0838   TSH 2.165     Urine Culture: No results for input(s): "LABURIN" in the last 48 hours.    Urine Studies:   No results for input(s): "COLORU", "APPEARANCEUA", "PHUR", "SPECGRAV", "PROTEINUA", "GLUCUA", "KETONESU", "BILIRUBINUA", "OCCULTUA", "NITRITE", "UROBILINOGEN", "LEUKOCYTESUR", "RBCUA", "WBCUA", "BACTERIA", "SQUAMEPITHEL", "HYALINECASTS" in the last 48 hours.    Invalid input(s): "WRIGHTSUR"      Significant Imaging: I have reviewed all pertinent imaging results/findings within the past 24 hours.  "

## 2023-10-30 NOTE — ASSESSMENT & PLAN NOTE
Patient's FSGs are controlled on current medication regimen.  Last A1c reviewed-   Lab Results   Component Value Date    HGBA1C 5.4 10/02/2023     Most recent fingerstick glucose reviewed-   Recent Labs   Lab 10/29/23  1630 10/29/23  2009 10/30/23  0737 10/30/23  1137   POCTGLUCOSE 114* 124* 116* 110     Current correctional scale  Low  Maintain anti-hyperglycemic dose as follows-   Antihyperglycemics (From admission, onward)    Start     Stop Route Frequency Ordered    10/26/23 1315  insulin aspart U-100 pen 0-5 Units         -- SubQ Before meals & nightly PRN 10/26/23 1216        Hold Oral hypoglycemics while patient is in the hospital.

## 2023-10-30 NOTE — NURSING
Patient and family member upset and voicing concerns as R hand/arm is progressively worsening in regards to swelling and pain. MACIEJ Sandoval notified.

## 2023-10-30 NOTE — ASSESSMENT & PLAN NOTE
U/A with 1+ leukocytes and 20 WBC on microscopic  Urine cultures klebsiella pneumoniae  Continue IV levofloxacin , home on po     10/30: D/c IV levofloxacin and start Augmentin.  Urine culture klebsiella pneumoniae pansensitive

## 2023-10-30 NOTE — ASSESSMENT & PLAN NOTE
Recent MRSA infection to right hand  Edema and bruising present  Soft tissue u/s ordered--showing new areas of fluid collection. Ct with contrast pending  Wound care consulted  General surgery here can not operate on hands. Transfer for hand surgeon initiated

## 2023-10-30 NOTE — PROGRESS NOTES
"Pharmacokinetic Initial Assessment: IV Vancomycin    Assessment/Plan:    Initiate intravenous vancomycin with loading dose of 2250 mg once followed by a maintenance dose of vancomycin 1000mg IV every 12 hours  Desired empiric serum trough concentration is 10 to 20 mcg/mL  Draw vancomycin trough level 60 min prior to fourth dose on 11/1/23 at approximately 0530  Pharmacy will continue to follow and monitor vancomycin.      Please contact pharmacy at extension [phone] with any questions regarding this assessment.     Thank you for the consult,   Buzz Polanco       Patient brief summary:  Debbie Zepeda is a 88 y.o. male initiated on antimicrobial therapy with IV Vancomycin for treatment of suspected skin & soft tissue infection    Drug Allergies:   Review of patient's allergies indicates:  No Known Allergies    Actual Body Weight:   100.1kg    Renal Function:   Estimated Creatinine Clearance: 67.1 mL/min (based on SCr of 1 mg/dL).,     Dialysis Method (if applicable):  N/A    CBC (last 72 hours):  Recent Labs   Lab Result Units 10/30/23  0555   WBC K/uL 1.77*   Hemoglobin g/dL 8.5*   Hematocrit % 25.8*   Platelets K/uL 122*   Gran % % 63.0   Lymph % % 32.0   Mono % % 5.0   Eosinophil % % 0.0   Basophil % % 0.0   Differential Method  Manual       Metabolic Panel (last 72 hours):  Recent Labs   Lab Result Units 10/28/23  0603 10/30/23  0555   Sodium mmol/L 138 139   Potassium mmol/L 3.5 3.5   Chloride mmol/L 108 112*   CO2 mmol/L 20* 19*   Glucose mg/dL 97 101   BUN mg/dL 16 10   Creatinine mg/dL 1.1 1.0       Drug levels (last 3 results):  No results for input(s): "VANCOMYCINRA", "VANCORANDOM", "VANCOMYCINPE", "VANCOPEAK", "VANCOMYCINTR", "VANCOTROUGH" in the last 72 hours.    Microbiologic Results:  Microbiology Results (last 7 days)       Procedure Component Value Units Date/Time    Blood Culture #1 **CANNOT BE ORDERED STAT** [[id number]] Collected: 10/25/23 7201    Order Status: Completed Specimen: Blood from " Peripheral, Left Hand Updated: 10/30/23 0612     Blood Culture, Routine No Growth to date      No Growth to date      No Growth to date      No Growth to date      No Growth to date    Blood Culture #2 **CANNOT BE ORDERED STAT** [[id number]] Collected: 10/25/23 1814    Order Status: Completed Specimen: Blood from Peripheral, Left Wrist Updated: 10/29/23 2212     Blood Culture, Routine No Growth to date      No Growth to date      No Growth to date      No Growth to date      No Growth to date    Urine culture High Risk **CANNOT BE ORDERED STAT** [[id number]]  (Abnormal)  (Susceptibility) Collected: 10/25/23 1735    Order Status: Completed Specimen: Urine, Clean Catch Updated: 10/27/23 2138     Urine Culture, Routine KLEBSIELLA PNEUMONIAE  >100,000 cfu/ml      Narrative:      Indicated criteria for high risk culture:->Prior to urologic  procedures

## 2023-10-31 PROBLEM — Z71.89 ADVANCED CARE PLANNING/COUNSELING DISCUSSION: Status: ACTIVE | Noted: 2023-10-31

## 2023-10-31 PROBLEM — S66.821A EXTENSOR TENDON LACERATION OF RIGHT HAND WITH OPEN WOUND: Status: ACTIVE | Noted: 2023-10-31

## 2023-10-31 PROBLEM — S61.401A EXTENSOR TENDON LACERATION OF RIGHT HAND WITH OPEN WOUND: Status: ACTIVE | Noted: 2023-10-31

## 2023-10-31 LAB
ALBUMIN SERPL BCP-MCNC: 1.9 G/DL (ref 3.5–5.2)
ALLENS TEST: ABNORMAL
ALP SERPL-CCNC: 76 U/L (ref 55–135)
ALT SERPL W/O P-5'-P-CCNC: 14 U/L (ref 10–44)
ANION GAP SERPL CALC-SCNC: 8 MMOL/L (ref 8–16)
AST SERPL-CCNC: 23 U/L (ref 10–40)
BACTERIA BLD CULT: NORMAL
BASOPHILS # BLD AUTO: 0.02 K/UL (ref 0–0.2)
BASOPHILS NFR BLD: 0.1 % (ref 0–1.9)
BILIRUB SERPL-MCNC: 0.7 MG/DL (ref 0.1–1)
BUN SERPL-MCNC: 9 MG/DL (ref 8–23)
CALCIUM SERPL-MCNC: 7.4 MG/DL (ref 8.7–10.5)
CHLORIDE SERPL-SCNC: 111 MMOL/L (ref 95–110)
CO2 SERPL-SCNC: 20 MMOL/L (ref 23–29)
CREAT SERPL-MCNC: 1 MG/DL (ref 0.5–1.4)
DELSYS: ABNORMAL
DIFFERENTIAL METHOD: ABNORMAL
EOSINOPHIL # BLD AUTO: 0 K/UL (ref 0–0.5)
EOSINOPHIL NFR BLD: 0.2 % (ref 0–8)
ERYTHROCYTE [DISTWIDTH] IN BLOOD BY AUTOMATED COUNT: 13.7 % (ref 11.5–14.5)
EST. GFR  (NO RACE VARIABLE): >60 ML/MIN/1.73 M^2
FIO2: 21 (ref 21–100)
GLUCOSE SERPL-MCNC: 92 MG/DL (ref 70–110)
HCO3 UR-SCNC: 23.8 MMOL/L
HCT VFR BLD AUTO: 27.2 % (ref 40–54)
HGB BLD-MCNC: 9.1 G/DL (ref 14–18)
IMM GRANULOCYTES # BLD AUTO: 0.8 K/UL (ref 0–0.04)
IMM GRANULOCYTES NFR BLD AUTO: 4.1 % (ref 0–0.5)
LYMPHOCYTES # BLD AUTO: 1 K/UL (ref 1–4.8)
LYMPHOCYTES NFR BLD: 4.9 % (ref 18–48)
MAGNESIUM SERPL-MCNC: 1.6 MG/DL (ref 1.6–2.6)
MCH RBC QN AUTO: 36.3 PG (ref 27–31)
MCHC RBC AUTO-ENTMCNC: 33.5 G/DL (ref 32–36)
MCV RBC AUTO: 108 FL (ref 82–98)
MONOCYTES # BLD AUTO: 0.6 K/UL (ref 0.3–1)
MONOCYTES NFR BLD: 3 % (ref 4–15)
NEUTROPHILS # BLD AUTO: 17.2 K/UL (ref 1.8–7.7)
NEUTROPHILS NFR BLD: 87.7 % (ref 38–73)
NRBC BLD-RTO: 0 /100 WBC
PCO2 BLDA: 35 MMHG (ref 35–45)
PH SMN: 7.44 [PH] (ref 7.35–7.45)
PLATELET # BLD AUTO: 158 K/UL (ref 150–450)
PMV BLD AUTO: 10.2 FL (ref 9.2–12.9)
PO2 BLDA: 66 MMHG (ref 75–100)
POC BE: -0.1 MMOL/L (ref -2–2)
POC COHB: 2.3 % (ref 0–3)
POC METHB: 1 % (ref 0–1.5)
POC O2HB ARTERIAL: 93.4 % (ref 94–100)
POC SATURATED O2: 96.6 % (ref 90–100)
POC TCO2: 24.9 MMOL/L
POC THB: 9.3 G/DL (ref 12–18)
POCT GLUCOSE: 119 MG/DL (ref 70–110)
POCT GLUCOSE: 99 MG/DL (ref 70–110)
POTASSIUM SERPL-SCNC: 3.5 MMOL/L (ref 3.5–5.1)
PROT SERPL-MCNC: 5.1 G/DL (ref 6–8.4)
RBC # BLD AUTO: 2.51 M/UL (ref 4.6–6.2)
SITE: ABNORMAL
SODIUM SERPL-SCNC: 139 MMOL/L (ref 136–145)
WBC # BLD AUTO: 19.57 K/UL (ref 3.9–12.7)

## 2023-10-31 PROCEDURE — 82803 BLOOD GASES ANY COMBINATION: CPT | Performed by: PHYSICIAN ASSISTANT

## 2023-10-31 PROCEDURE — 97530 THERAPEUTIC ACTIVITIES: CPT | Mod: CO

## 2023-10-31 PROCEDURE — 83735 ASSAY OF MAGNESIUM: CPT | Performed by: PHYSICIAN ASSISTANT

## 2023-10-31 PROCEDURE — 36600 WITHDRAWAL OF ARTERIAL BLOOD: CPT

## 2023-10-31 PROCEDURE — 25000003 PHARM REV CODE 250: Performed by: PHYSICIAN ASSISTANT

## 2023-10-31 PROCEDURE — 99900035 HC TECH TIME PER 15 MIN (STAT)

## 2023-10-31 PROCEDURE — 85025 COMPLETE CBC W/AUTO DIFF WBC: CPT | Performed by: PHYSICIAN ASSISTANT

## 2023-10-31 PROCEDURE — 63600175 PHARM REV CODE 636 W HCPCS: Mod: JZ,TB | Performed by: PHYSICIAN ASSISTANT

## 2023-10-31 PROCEDURE — 36415 COLL VENOUS BLD VENIPUNCTURE: CPT | Performed by: PHYSICIAN ASSISTANT

## 2023-10-31 PROCEDURE — 80053 COMPREHEN METABOLIC PANEL: CPT | Performed by: PHYSICIAN ASSISTANT

## 2023-10-31 PROCEDURE — 25000003 PHARM REV CODE 250: Performed by: FAMILY MEDICINE

## 2023-10-31 PROCEDURE — 94760 N-INVAS EAR/PLS OXIMETRY 1: CPT

## 2023-10-31 PROCEDURE — 99233 PR SUBSEQUENT HOSPITAL CARE,LEVL III: ICD-10-PCS | Mod: ,,, | Performed by: PHYSICIAN ASSISTANT

## 2023-10-31 PROCEDURE — 63600175 PHARM REV CODE 636 W HCPCS: Performed by: FAMILY MEDICINE

## 2023-10-31 PROCEDURE — 97530 THERAPEUTIC ACTIVITIES: CPT

## 2023-10-31 PROCEDURE — 99233 SBSQ HOSP IP/OBS HIGH 50: CPT | Mod: ,,, | Performed by: PHYSICIAN ASSISTANT

## 2023-10-31 RX ORDER — ACETAMINOPHEN 325 MG/1
650 TABLET ORAL EVERY 6 HOURS PRN
Status: DISCONTINUED | OUTPATIENT
Start: 2023-10-31 | End: 2023-10-31 | Stop reason: HOSPADM

## 2023-10-31 RX ORDER — MAGNESIUM SULFATE HEPTAHYDRATE 40 MG/ML
2 INJECTION, SOLUTION INTRAVENOUS ONCE
Status: COMPLETED | OUTPATIENT
Start: 2023-10-31 | End: 2023-10-31

## 2023-10-31 RX ADMIN — MAGNESIUM SULFATE HEPTAHYDRATE 2 G: 40 INJECTION, SOLUTION INTRAVENOUS at 10:10

## 2023-10-31 RX ADMIN — FILGRASTIM 300 MCG: 300 INJECTION, SOLUTION INTRAVENOUS; SUBCUTANEOUS at 08:10

## 2023-10-31 RX ADMIN — VANCOMYCIN HYDROCHLORIDE 1000 MG: 1 INJECTION, POWDER, LYOPHILIZED, FOR SOLUTION INTRAVENOUS at 06:10

## 2023-10-31 RX ADMIN — LACTOBACILLUS ACIDOPHILUS / LACTOBACILLUS BULGARICUS 1 EACH: 100 MILLION CFU STRENGTH GRANULES at 08:10

## 2023-10-31 RX ADMIN — ACETAMINOPHEN 650 MG: 325 TABLET ORAL at 03:10

## 2023-10-31 RX ADMIN — AMOXICILLIN AND CLAVULANATE POTASSIUM 500 MG: 500; 125 TABLET, FILM COATED ORAL at 08:10

## 2023-10-31 RX ADMIN — HYDROCODONE BITARTRATE AND ACETAMINOPHEN 1 TABLET: 10; 325 TABLET ORAL at 08:10

## 2023-10-31 RX ADMIN — PROCHLORPERAZINE MALEATE 5 MG: 5 TABLET ORAL at 03:10

## 2023-10-31 RX ADMIN — APIXABAN 5 MG: 5 TABLET, FILM COATED ORAL at 08:10

## 2023-10-31 RX ADMIN — PANTOPRAZOLE SODIUM 40 MG: 40 TABLET, DELAYED RELEASE ORAL at 08:10

## 2023-10-31 NOTE — ASSESSMENT & PLAN NOTE
"This patient does have evidence of infective focus  My overall impression is sepsis.  Source: Respiratory and Urinary Tract  Antibiotics given-   Antibiotics (72h ago, onward)    Start     Stop Route Frequency Ordered    10/30/23 1850  vancomycin - pharmacy to dose  (vancomycin IVPB (PEDS and ADULTS))        See Hyperspace for full Linked Orders Report.    -- IV pharmacy to manage frequency 10/30/23 1750    10/30/23 1000  amoxicillin-clavulanate 500-125mg per tablet 500 mg         11/02/23 0859 Oral 2 times daily 10/30/23 0859    10/30/23 0630  vancomycin (VANCOCIN) 1,000 mg in dextrose 5 % (D5W) 250 mL IVPB (Vial-Mate)         -- IV Every 12 hours (non-standard times) 10/30/23 1758        Latest lactate reviewed-  No results for input(s): "LACTATE" in the last 72 hours.  Organ dysfunction indicated by Acute kidney injury and Acute heart failure    Fluid challenge Patient recevied 1 L bolus in the ED.  Will give IV fluids.  Pleural effusions seen on CTAP   Post- resuscitation assessment No - Post resuscitation assessment not needed       Will Not start Pressors- Levophed for MAP of 65  Source control achieved by: IV levofloxacin   Blood CX pending and urine culture gram negative rods     D/c levofloxacin and started augmentin 10/30/23 due to pancytopenia which is possibly antibx induced    "

## 2023-10-31 NOTE — NURSING
Attempted to give report to nurse at [facility]. Nurse unavailable at this moment, will follow up in 15 minutes.

## 2023-10-31 NOTE — PLAN OF CARE
Problem: Infection  Goal: Absence of Infection Signs and Symptoms  Outcome: Ongoing, Progressing     Problem: Adult Inpatient Plan of Care  Goal: Plan of Care Review  Outcome: Ongoing, Progressing  Goal: Optimal Comfort and Wellbeing  Outcome: Ongoing, Progressing  Goal: Readiness for Transition of Care  Outcome: Ongoing, Progressing     Problem: Diabetes Comorbidity  Goal: Blood Glucose Level Within Targeted Range  Outcome: Ongoing, Progressing     Problem: Impaired Wound Healing  Goal: Optimal Wound Healing  Outcome: Ongoing, Progressing     Problem: Fall Injury Risk  Goal: Absence of Fall and Fall-Related Injury  Outcome: Ongoing, Progressing     Problem: Skin Injury Risk Increased  Goal: Skin Health and Integrity  Outcome: Ongoing, Progressing     Problem: UTI (Urinary Tract Infection)  Goal: Improved Infection Symptoms  Outcome: Ongoing, Progressing     Problem: Fluid and Electrolyte Imbalance (Acute Kidney Injury/Impairment)  Goal: Fluid and Electrolyte Balance  Outcome: Ongoing, Progressing     Problem: Oral Intake Inadequate (Acute Kidney Injury/Impairment)  Goal: Optimal Nutrition Intake  Outcome: Ongoing, Progressing     Problem: Renal Function Impairment (Acute Kidney Injury/Impairment)  Goal: Effective Renal Function  Outcome: Ongoing, Progressing

## 2023-10-31 NOTE — ASSESSMENT & PLAN NOTE
Patient's FSGs are controlled on current medication regimen.  Last A1c reviewed-   Lab Results   Component Value Date    HGBA1C 5.4 10/02/2023     Most recent fingerstick glucose reviewed-   Recent Labs   Lab 10/30/23  1137 10/30/23  1638 10/30/23  1947 10/31/23  0722   POCTGLUCOSE 110 99 109 119*     Current correctional scale  Low  Maintain anti-hyperglycemic dose as follows-   Antihyperglycemics (From admission, onward)    Start     Stop Route Frequency Ordered    10/26/23 1315  insulin aspart U-100 pen 0-5 Units         -- SubQ Before meals & nightly PRN 10/26/23 1216        Hold Oral hypoglycemics while patient is in the hospital.

## 2023-10-31 NOTE — SUBJECTIVE & OBJECTIVE
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       Past Surgical History:   Procedure Laterality Date    CHOLECYSTECTOMY      INCISION AND DRAINAGE OF HAND Right 10/6/2023    Procedure: INCISION AND DRAINAGE, HAND, IRRIGATION AND DRAINAGE;  Surgeon: Peter Silvestre MD;  Location: [facility] OR;  Service: Orthopedics;  Laterality: Right;  hand  local requested per the family  7am per [place]    INSERTION OF PACEMAKER N/A 1/14/2019    Procedure: INSERTION, PACEMAKER;  Surgeon: Sabino Galicia MD;  Location: [facility] CATH;  Service: Cardiology;  Laterality: N/A;       Review of patient's allergies indicates:  No Known Allergies    No current facility-administered medications on file prior to encounter.     Current Outpatient Medications on File Prior to Encounter   Medication Sig    apixaban (ELIQUIS) 5 mg Tab Take 1 tablet (5 mg total) by mouth 2 (two) times daily.    ascorbic acid, vitamin C, (VITAMIN C) 500 MG tablet Take 500 mg by mouth once daily.    atorvastatin (LIPITOR) 40 MG tablet Take 40 mg by mouth every evening.    gabapentin (NEURONTIN) 100 MG capsule Take 1 capsule (100 mg total) by mouth every evening.    HYDROcodone-acetaminophen (NORCO)  mg per tablet Take 1 tablet by mouth every 6 (six) hours as needed for Pain.    multivitamin (ONE DAILY MULTIVITAMIN) per tablet Take 1 tablet by mouth once daily.    omeprazole (PRILOSEC) 20 MG capsule Take 20 mg by mouth once daily.    ondansetron (ZOFRAN) 4 MG tablet Take 4 mg by mouth every 8 (eight) hours as needed for Nausea.    potassium chloride (KLOR-CON) 10 MEQ TbSR Take 1 tablet (10 mEq total) by mouth every other day.    thiamine 100 MG tablet Take 100 mg by mouth once daily.    vitamin D (VITAMIN D3) 1000 units Tab Take 1,000 Units by mouth once daily.    zinc gluconate 50 mg tablet Take 50 mg  by mouth once daily.    acetaminophen (TYLENOL) 650 MG Supp Place 1 suppository (650 mg total) rectally every 4 (four) hours as needed.     Family History    None       Tobacco Use    Smoking status: Never    Smokeless tobacco: Never   Substance and Sexual Activity    Alcohol use: Yes     Comment: 2-3 [place] per night.    Drug use: No    Sexual activity: Not on file     Review of Systems   Constitutional:  Negative for chills and fever.   HENT:  Negative for ear discharge and ear pain.    Eyes:  Negative for pain and discharge.   Respiratory:  Negative for cough and shortness of breath.    Cardiovascular:  Negative for chest pain and leg swelling.   Gastrointestinal:  Negative for nausea and vomiting.   Endocrine: Negative for cold intolerance and heat intolerance.   Genitourinary:  Negative for difficulty urinating and dysuria.   Musculoskeletal:  Negative for joint swelling and myalgias.   Skin:  Negative for rash and wound.   Neurological:  Negative for dizziness and headaches.   Psychiatric/Behavioral:  Negative for agitation and confusion.      Objective:     Vital Signs (Most Recent):  Temp: 98.1 °F (36.7 °C) (10/31/23 0732)  Pulse: (!) 59 (10/31/23 1002)  Resp: 18 (10/31/23 0848)  BP: (!) 108/56 (10/31/23 0732)  SpO2: 95 % (10/31/23 0735) Vital Signs (24h Range):  Temp:  [98.1 °F (36.7 °C)-99.1 °F (37.3 °C)] 98.1 °F (36.7 °C)  Pulse:  [59-81] 59  Resp:  [16-20] 18  SpO2:  [95 %-98 %] 95 %  BP: (100-120)/(54-59) 108/56     Weight: 100.1 kg (220 lb 10.9 oz)  Body mass index is 28.33 kg/m².     Physical Exam  Constitutional:       General: He is not in acute distress.  HENT:      Head: Normocephalic and atraumatic.   Eyes:      General:         Right eye: No discharge.         Left eye: No discharge.   Cardiovascular:      Rate and Rhythm: Normal rate. Rhythm irregular.   Pulmonary:      Effort: Pulmonary effort is normal.      Breath sounds: Normal breath sounds.   Abdominal:      General: There is no  "distension.      Tenderness: There is no abdominal tenderness.   Musculoskeletal:         General: Swelling (RUE) present. No tenderness.      Cervical back: Neck supple. No tenderness.      Right lower leg: Edema (+1) present.      Left lower leg: Edema (+1) present.      Comments: Under left elbow pt showed a pocket of edema that has been leaking by elbow    Skin:     General: Skin is warm and dry.      Findings: Bruising (right upper arm) present.   Neurological:      General: No focal deficit present.      Mental Status: He is alert and oriented to person, place, and time.      Comments: Patient's speech is delayed but eventually answers.     Psychiatric:         Mood and Affect: Mood normal.         Behavior: Behavior normal.                Significant Labs: A1C:   Recent Labs   Lab 10/02/23  1455   HGBA1C 5.4       ABGs: No results for input(s): "PH", "PCO2", "HCO3", "POCSATURATED", "BE", "TOTALHB", "COHB", "METHB", "O2HB", "POCFIO2", "PO2" in the last 48 hours.  Bilirubin:   Recent Labs   Lab 10/02/23  1430 10/03/23  0536 10/25/23  1524 10/26/23  1305 10/31/23  0538   BILITOT 3.9* 2.5* 0.7 0.6 0.7       Blood Culture:   No results for input(s): "LABBLOO" in the last 48 hours.    BMP:   Recent Labs   Lab 10/31/23  0538   GLU 92      K 3.5   *   CO2 20*   BUN 9   CREATININE 1.0   CALCIUM 7.4*   MG 1.6       CBC:   Recent Labs   Lab 10/30/23  0555 10/31/23  0538   WBC 1.77* 19.57*   HGB 8.5* 9.1*   HCT 25.8* 27.2*   * 158       CMP:   Recent Labs   Lab 10/30/23  0555 10/31/23  0538    139   K 3.5 3.5   * 111*   CO2 19* 20*    92   BUN 10 9   CREATININE 1.0 1.0   CALCIUM 7.3* 7.4*   PROT  --  5.1*   ALBUMIN  --  1.9*   BILITOT  --  0.7   ALKPHOS  --  76   AST  --  23   ALT  --  14   ANIONGAP 8 8       Cardiac Markers:   No results for input(s): "CKMB", "MYOGLOBIN", "BNP", "TROPISTAT" in the last 48 hours.    Coagulation: No results for input(s): "PT", "INR", "APTT" in the " "last 48 hours.  Lactic Acid:   No results for input(s): "LACTATE" in the last 48 hours.    Lipase: No results for input(s): "LIPASE" in the last 48 hours.  Lipid Panel: No results for input(s): "CHOL", "HDL", "LDLCALC", "TRIG", "CHOLHDL" in the last 48 hours.  Magnesium:   Recent Labs   Lab 10/31/23  0538   MG 1.6     POCT Glucose:   Recent Labs   Lab 10/30/23  1638 10/30/23  1947 10/31/23  0722   POCTGLUCOSE 99 109 119*       Prealbumin: No results for input(s): "PREALBUMIN" in the last 48 hours.  Respiratory Culture: No results for input(s): "GSRESP", "RESPIRATORYC" in the last 48 hours.  Troponin:   No results for input(s): "TROPONINI", "TROPONINIHS" in the last 48 hours.    TSH:   Recent Labs   Lab 10/30/23  0838   TSH 2.165       Urine Culture: No results for input(s): "LABURIN" in the last 48 hours.    Urine Studies:   No results for input(s): "COLORU", "APPEARANCEUA", "PHUR", "SPECGRAV", "PROTEINUA", "GLUCUA", "KETONESU", "BILIRUBINUA", "OCCULTUA", "NITRITE", "UROBILINOGEN", "LEUKOCYTESUR", "RBCUA", "WBCUA", "BACTERIA", "SQUAMEPITHEL", "HYALINECASTS" in the last 48 hours.    Invalid input(s): "WRIGHTSUR"      Significant Imaging: I have reviewed all pertinent imaging results/findings within the past 24 hours.  "

## 2023-10-31 NOTE — ASSESSMENT & PLAN NOTE
BNP mildly elevated   Seen on CTAP moderate left and small right pleural effusion with adjacent atelectasis   Gentle hydration but at risk for increasing pulmonary edema/pleural effusions  O2 sats currently stable; no signs of respiratory distress

## 2023-10-31 NOTE — DISCHARGE SUMMARY
[place] [facility] ([facility])  [facility] Medicine  Discharge Summary      Patient Name: Debbie Zepeda  MRN: [id number]  BLAISE: [id number]  Patient Class: IP- Inpatient  Admission Date: 10/25/2023  Hospital Length of Stay: 5 days  Discharge Date and Time:  10/30/2023 7:08 PM  Attending Physician: Barron Garcia MD   Discharging Provider: Lakeisha Resendiz MD  Primary Care Provider: iNgel Webb MD    Primary Care Team: Networked reference to record PCT     HPI:   Patient is an 88 year old male with medical history of HTN, HLD, PAF, CAD, HF EF45%, DM type 2, CKD stage 3 and prior stroke who was admitted to ED for urosepsis.  He was sent from nursing home with weakness, fever and hypoxia.  Patient denies any acute complaints such as cough, CP, SOB, nausea, vomiting dysuria and urinary retention.  Additionally there has been an outbreak of covid at NH and patient tested positive.        Admitted for urosepsis.  H/o neurogenic bladder and urinary retention.        * No surgery found *      Hospital Course:   PT admitted yesterday for urosepsis.  BP on lower side.  Gentle hydration.  JACKELIN mildly improving.  Will continue to monitor fluid status.  He is at high risk for fluid overload due to pulmonary HTN.  Additionally pleural effusions seen on CXR.      Urine culture gram negative rods.  Continue levofloxacin.      Urine is nearly pan sensitive. Will go home today on flouroquinolone ]      10/29  D/c home yesterday but NH denied to take him. Can't take a skilled pt on the weekends is what I was told. Should go home tomorrow  No issues overnight   AFVSS     10/30 discharge back to NH held today. PT bp on lower side still.  Pancytopenia.  Will d/c levofloxacin and switch to augmentin for 3 more days for UTI treatment.  H/H on lower side and will trend.  RUE with continued edema.  RUQ u/s ordered showing concern for underlying abscess; repeat CT with contrast ordered and pending.  General surgery here can not see  hand patients. Started Vanc given prior MRSA infection of hand. Transfer initiated to return to [facility] for ortho care.        Goals of Care Treatment Preferences:  Code Status: DNR      Consults:   Consults (From admission, onward)        Status Ordering Provider     Pharmacy to dose Vancomycin consult  Once        Provider:  (Not yet assigned)   See Theresa for full Linked Orders Report.    Acknowledged MICHELLE ERIC     Inpatient consult to General Surgery  Once        Provider:  Sean Contreras MD    Completed DONA CALDERON          Pulmonary  Pleural effusion  BNP mildly elevated   Seen on CTAP moderate left and small right pleural effusion with adjacent atelectasis   Gentle hydration but at risk for increasing pulmonary edema/pleural effusions  O2 sats currently stable; no signs of respiratory distress      Cardiac/Vascular  Pulmonary hypertension  Seen on Echo   Moderate PHTN   Pulmonary effusion   Careful hydration    Atrial fibrillation  Rate controlled  Continue eliquis     Renal/  Complicated urinary tract infection  U/A with 1+ leukocytes and 20 WBC on microscopic  Urine cultures klebsiella pneumoniae  Continue IV levofloxacin , home on po     10/30: D/c IV levofloxacin and start Augmentin.  Urine culture klebsiella pneumoniae pansensitive     Acute kidney injury superimposed on chronic kidney disease  Patient with acute kidney injury/acute renal failure likely due to pre-renal azotemia due to dehydration JACKELIN is currently stable. Baseline creatinine 1.0 - Labs reviewed- Renal function/electrolytes with Estimated Creatinine Clearance: 67.1 mL/min (based on SCr of 1 mg/dL). according to latest data. Monitor urine output and serial BMP and adjust therapy as needed. Avoid nephrotoxins and renally dose meds for GFR listed above.    IV fluids.  In the setting of JACKELIN/UTI CT AP ordered and no renal stones seen.      Slowly improving     Back to baseline       ID  * Sepsis due to gram-negative UTI  This  "patient does have evidence of infective focus  My overall impression is sepsis.  Source: Respiratory and Urinary Tract  Antibiotics given-   Antibiotics (72h ago, onward)    Start     Stop Route Frequency Ordered    10/30/23 1945  vancomycin (VANCOCIN) 1,000 mg in dextrose 5 % (D5W) 250 mL IVPB (Vial-Mate)        See Hyperspace for full Linked Orders Report.    -- IV Once 10/30/23 1756    10/30/23 1850  vancomycin - pharmacy to dose  (vancomycin IVPB (PEDS and ADULTS))        See Hyperspace for full Linked Orders Report.    -- IV pharmacy to manage frequency 10/30/23 1750    10/30/23 1815  vancomycin 1,250 mg in dextrose 5 % (D5W) 250 mL IVPB (Vial-Mate)        See Hyperspace for full Linked Orders Report.    -- IV Once 10/30/23 1756    10/30/23 1000  amoxicillin-clavulanate 500-125mg per tablet 500 mg         11/02/23 0859 Oral 2 times daily 10/30/23 0859    10/30/23 0630  vancomycin (VANCOCIN) 1,000 mg in dextrose 5 % (D5W) 250 mL IVPB (Vial-Mate)         -- IV Every 12 hours (non-standard times) 10/30/23 1758    10/26/23 0900  mupirocin 2 % ointment         10/31/23 0859 Nasl 2 times daily 10/26/23 0046        Latest lactate reviewed-  No results for input(s): "LACTATE" in the last 72 hours.  Organ dysfunction indicated by Acute kidney injury and Acute heart failure    Fluid challenge Patient recevied 1 L bolus in the ED.  Will give IV fluids.  Pleural effusions seen on CTAP   Post- resuscitation assessment No - Post resuscitation assessment not needed       Will Not start Pressors- Levophed for MAP of 65  Source control achieved by: IV levofloxacin   Blood CX pending and urine culture gram negative rods     D/c levofloxacin and started augmentin 10/30/23 due to pancytopenia which is possibly antibx induced      MRSA infection  Recent MRSA infection to right hand  Edema and bruising present  Soft tissue u/s ordered--showing new areas of fluid collection. Ct with contrast pending  Wound care consulted  General " surgery here can not operate on hands. Transfer for hand surgeon initiated      COVID-19  Patient is identified as positive for COVID   Initiate standard COVID protocols; COVID-19 testing Collection Date: 10/12/2023 Collection Time:  12:01 PM ,Infection Control notification  and isolation- respiratory, contact and droplet per protocol    Diagnostics: daily labs     Management: none at this time.  Pt is not hypoxic     Advance Care Planning  Patient is a DNR.  Lapost in chart     Oncology  Pancytopenia  Possible drug induced from levofloxacin.  Neupogen ordered this AM--may hold further doses.    Recent Labs   Lab 10/30/23  0555   HGB 8.5*   WBC 1.77*   *       Augmentin for UTI coverage and IV Vanc for MRSA coverage for hand    Macrocytic anemia    Current CBC reviewed-   Lab Results   Component Value Date    HGB 8.5 (L) 10/30/2023    HCT 25.8 (L) 10/30/2023     Monitor serial CBC and transfuse if patient becomes hemodynamically unstable, symptomatic or H/H drops below 8.    Not far from baseline.  Anemia work up pending.      Endocrine  Type 2 diabetes mellitus with circulatory disorder  Patient's FSGs are controlled on current medication regimen.  Last A1c reviewed-   Lab Results   Component Value Date    HGBA1C 5.4 10/02/2023     Most recent fingerstick glucose reviewed-   Recent Labs   Lab 10/29/23  2009 10/30/23  0737 10/30/23  1137 10/30/23  1638   POCTGLUCOSE 124* 116* 110 99     Current correctional scale  Low  Maintain anti-hyperglycemic dose as follows-   Antihyperglycemics (From admission, onward)    Start     Stop Route Frequency Ordered    10/26/23 1315  insulin aspart U-100 pen 0-5 Units         -- SubQ Before meals & nightly PRN 10/26/23 1216        Hold Oral hypoglycemics while patient is in the hospital.    Other  Acute pulmonary embolism  Continue home Eliquis         Final Active Diagnoses:    Diagnosis Date Noted POA    PRINCIPAL PROBLEM:  Sepsis due to gram-negative UTI [A41.50, N39.0]  10/26/2023 Yes    Pancytopenia [D61.818] 10/30/2023 Yes    MRSA infection [A49.02] 10/30/2023 Yes    Pulmonary hypertension [I27.20] 10/27/2023 Yes    Acute kidney injury superimposed on chronic kidney disease [N17.9, N18.9] 10/26/2023 Yes    COVID-19 [U07.1] 10/26/2023 Yes    Pleural effusion [J90] 10/26/2023 Yes    Complicated urinary tract infection [N39.0] 10/26/2023 Yes    Atrial fibrillation [I48.91] 10/02/2023 Yes    Macrocytic anemia [D53.9] 10/02/2023 Yes    Acute pulmonary embolism [I26.99] 02/15/2019 Yes    Type 2 diabetes mellitus with circulatory disorder [E11.59] 01/12/2019 Yes      Problems Resolved During this Admission:       Discharged Condition: stable    Disposition: Short Term Hospital    Follow Up:   Follow-up Information     [facility] Follow up.           Chantell Abbasi NP Follow up in 3 day(s).    Specialty: Cardiology  Contact information:  [id number]  [facility] [address]  [phone]                       Patient Instructions:   No discharge procedures on file.    Significant Diagnostic Studies: Labs:   CMP   Recent Labs   Lab 10/30/23  0555      K 3.5   *   CO2 19*      BUN 10   CREATININE 1.0   CALCIUM 7.3*   ANIONGAP 8   , CBC   Recent Labs   Lab 10/30/23  0555   WBC 1.77*   HGB 8.5*   HCT 25.8*   *    and All labs within the past 24 hours have been reviewed  Microbiology:   Urine Culture    Lab Results   Component Value Date    LABURIN KLEBSIELLA PNEUMONIAE  >100,000 cfu/ml   (A) 10/25/2023       Pending Diagnostic Studies:     Procedure Component Value Units Date/Time    CT Hand W W/O Contrast Right [[id number]]     Order Status: Sent Lab Status: No result          Medications:  Reconciled Home Medications:      Medication List      START taking these medications    amoxicillin-clavulanate 500-125mg 500-125 mg Tab  Commonly known as: AUGMENTIN  Take 1 tablet (500 mg total) by mouth 2 (two) times daily.  for 5 days        CONTINUE taking these medications    acetaminophen 650 MG Supp  Commonly known as: TYLENOL  Place 1 suppository (650 mg total) rectally every 4 (four) hours as needed.     apixaban 5 mg Tab  Commonly known as: ELIQUIS  Take 1 tablet (5 mg total) by mouth 2 (two) times daily.     ascorbic acid (vitamin C) 500 MG tablet  Commonly known as: VITAMIN C  Take 500 mg by mouth once daily.     atorvastatin 40 MG tablet  Commonly known as: LIPITOR  Take 40 mg by mouth every evening.     furosemide 20 MG tablet  Commonly known as: LASIX  Take 1 tablet (20 mg total) by mouth once daily.     gabapentin 100 MG capsule  Commonly known as: NEURONTIN  Take 1 capsule (100 mg total) by mouth every evening.     HYDROcodone-acetaminophen  mg per tablet  Commonly known as: NORCO  Take 1 tablet by mouth every 6 (six) hours as needed for Pain.     omeprazole 20 MG capsule  Commonly known as: PRILOSEC  Take 20 mg by mouth once daily.     ondansetron 4 MG tablet  Commonly known as: ZOFRAN  Take 4 mg by mouth every 8 (eight) hours as needed for Nausea.     ONE DAILY MULTIVITAMIN per tablet  Generic drug: multivitamin  Take 1 tablet by mouth once daily.     potassium chloride 10 MEQ Tbsr  Commonly known as: KLOR-CON  Take 1 tablet (10 mEq total) by mouth every other day.     thiamine 100 MG tablet  Take 100 mg by mouth once daily.     vitamin D 1000 units Tab  Commonly known as: VITAMIN D3  Take 1,000 Units by mouth once daily.     zinc gluconate 50 mg tablet  Take 50 mg by mouth once daily.        STOP taking these medications    L.acidophil,parac-S.therm-Bif. Cap capsule  Commonly known as: Risaquad            Indwelling Lines/Drains at time of discharge:   Lines/Drains/Airways     Drain  Duration                Urethral Catheter 10/25/23 1830 16 Fr. 5 days                Time spent on the discharge of patient: 22 minutes         Lakeisha Resendiz MD  Department of Hospital Medicine  [place] [facility] (3rd Fl)

## 2023-10-31 NOTE — PROGRESS NOTES
[facility] ([facility])  [facility] Medicine  Progress Note    Patient Name: Debbie Zepeda  MRN: [id number]  Patient Class: IP- Inpatient   Admission Date: 10/25/2023  Length of Stay: 6 days  Attending Physician: Barron Garcia MD  Primary Care Provider: Nigel Webb MD        Subjective:     Principal Problem:Sepsis due to gram-negative UTI        HPI:  Patient is an 88 year old male with medical history of HTN, HLD, PAF, CAD, HF EF45%, DM type 2, CKD stage 3 and prior stroke who was admitted to ED for urosepsis.  He was sent from nursing home with weakness, fever and hypoxia.  Patient denies any acute complaints such as cough, CP, SOB, nausea, vomiting dysuria and urinary retention.  Additionally there has been an outbreak of covid at NH and patient tested positive.        Admitted for urosepsis.  H/o neurogenic bladder and urinary retention.        Overview/Hospital Course:  PT admitted yesterday for urosepsis.  BP on lower side.  Gentle hydration.  JACKELIN mildly improving.  Will continue to monitor fluid status.  He is at high risk for fluid overload due to pulmonary HTN.  Additionally pleural effusions seen on CXR.      Urine culture gram negative rods.  Continue levofloxacin.      Urine is nearly pan sensitive. Will go home today on flouroquinolone ]      10/29  D/c home yesterday but NH denied to take him. Can't take a skilled pt on the weekends is what I was told. Should go home tomorrow  No issues overnight   AFVSS     10/30 discharge back to NH held today. PT bp on lower side still.  Pancytopenia.  Will d/c levofloxacin and switch to augmentin for 3 more days for UTI treatment.  H/H on lower side and will trend.  RUE with continued edema.  RUQ u/s ordered showing concern for underlying abscess; repeat CT with contrast ordered and pending.  General surgery here can not see hand patients. Started Vanc given prior MRSA infection of hand. Transfer initiated to return to [facility] for ortho care.     See  A&P for update    Past Medical History:   Diagnosis Date    Acute DVT (deep venous thrombosis) 2/17/2019    LLE    Cancer 1997    colon cancer    Diabetes mellitus     Hypercholesteremia     Hypertension     Pacemaker 2/17/2019       Past Surgical History:   Procedure Laterality Date    CHOLECYSTECTOMY      INCISION AND DRAINAGE OF HAND Right 10/6/2023    Procedure: INCISION AND DRAINAGE, HAND, IRRIGATION AND DRAINAGE;  Surgeon: Peter Silvestre MD;  Location: [facility] OR;  Service: Orthopedics;  Laterality: Right;  hand  local requested per the family  7am per [place]    INSERTION OF PACEMAKER N/A 1/14/2019    Procedure: INSERTION, PACEMAKER;  Surgeon: Sabino Galicia MD;  Location: [facility] CATH;  Service: Cardiology;  Laterality: N/A;       Review of patient's allergies indicates:  No Known Allergies    No current facility-administered medications on file prior to encounter.     Current Outpatient Medications on File Prior to Encounter   Medication Sig    apixaban (ELIQUIS) 5 mg Tab Take 1 tablet (5 mg total) by mouth 2 (two) times daily.    ascorbic acid, vitamin C, (VITAMIN C) 500 MG tablet Take 500 mg by mouth once daily.    atorvastatin (LIPITOR) 40 MG tablet Take 40 mg by mouth every evening.    gabapentin (NEURONTIN) 100 MG capsule Take 1 capsule (100 mg total) by mouth every evening.    HYDROcodone-acetaminophen (NORCO)  mg per tablet Take 1 tablet by mouth every 6 (six) hours as needed for Pain.    multivitamin (ONE DAILY MULTIVITAMIN) per tablet Take 1 tablet by mouth once daily.    omeprazole (PRILOSEC) 20 MG capsule Take 20 mg by mouth once daily.    ondansetron (ZOFRAN) 4 MG tablet Take 4 mg by mouth every 8 (eight) hours as needed for Nausea.    potassium chloride (KLOR-CON) 10 MEQ TbSR Take 1 tablet (10 mEq total) by mouth every other day.    thiamine 100 MG tablet Take 100 mg by mouth once daily.    vitamin D (VITAMIN D3) 1000 units Tab Take 1,000 Units by mouth once daily.     zinc gluconate 50 mg tablet Take 50 mg by mouth once daily.    acetaminophen (TYLENOL) 650 MG Supp Place 1 suppository (650 mg total) rectally every 4 (four) hours as needed.     Family History    None       Tobacco Use    Smoking status: Never    Smokeless tobacco: Never   Substance and Sexual Activity    Alcohol use: Yes     Comment: 2-3 [place] per night.    Drug use: No    Sexual activity: Not on file     Review of Systems   Constitutional:  Negative for chills and fever.   HENT:  Negative for ear discharge and ear pain.    Eyes:  Negative for pain and discharge.   Respiratory:  Negative for cough and shortness of breath.    Cardiovascular:  Negative for chest pain and leg swelling.   Gastrointestinal:  Negative for nausea and vomiting.   Endocrine: Negative for cold intolerance and heat intolerance.   Genitourinary:  Negative for difficulty urinating and dysuria.   Musculoskeletal:  Negative for joint swelling and myalgias.   Skin:  Negative for rash and wound.   Neurological:  Negative for dizziness and headaches.   Psychiatric/Behavioral:  Negative for agitation and confusion.      Objective:     Vital Signs (Most Recent):  Temp: 98.1 °F (36.7 °C) (10/31/23 0732)  Pulse: (!) 59 (10/31/23 1002)  Resp: 18 (10/31/23 0848)  BP: (!) 108/56 (10/31/23 0732)  SpO2: 95 % (10/31/23 0735) Vital Signs (24h Range):  Temp:  [98.1 °F (36.7 °C)-99.1 °F (37.3 °C)] 98.1 °F (36.7 °C)  Pulse:  [59-81] 59  Resp:  [16-20] 18  SpO2:  [95 %-98 %] 95 %  BP: (100-120)/(54-59) 108/56     Weight: 100.1 kg (220 lb 10.9 oz)  Body mass index is 28.33 kg/m².     Physical Exam  Constitutional:       General: He is not in acute distress.  HENT:      Head: Normocephalic and atraumatic.   Eyes:      General:         Right eye: No discharge.         Left eye: No discharge.   Cardiovascular:      Rate and Rhythm: Normal rate. Rhythm irregular.   Pulmonary:      Effort: Pulmonary effort is normal.      Breath sounds: Normal breath sounds.  "  Abdominal:      General: There is no distension.      Tenderness: There is no abdominal tenderness.   Musculoskeletal:         General: Swelling (RUE) present. No tenderness.      Cervical back: Neck supple. No tenderness.      Right lower leg: Edema (+1) present.      Left lower leg: Edema (+1) present.      Comments: Under left elbow pt showed a pocket of edema that has been leaking by elbow    Skin:     General: Skin is warm and dry.      Findings: Bruising (right upper arm) present.   Neurological:      General: No focal deficit present.      Mental Status: He is alert and oriented to person, place, and time.      Comments: Patient's speech is delayed but eventually answers.     Psychiatric:         Mood and Affect: Mood normal.         Behavior: Behavior normal.                Significant Labs: A1C:   Recent Labs   Lab 10/02/23  1455   HGBA1C 5.4       ABGs: No results for input(s): "PH", "PCO2", "HCO3", "POCSATURATED", "BE", "TOTALHB", "COHB", "METHB", "O2HB", "POCFIO2", "PO2" in the last 48 hours.  Bilirubin:   Recent Labs   Lab 10/02/23  1430 10/03/23  0536 10/25/23  1524 10/26/23  1305 10/31/23  0538   BILITOT 3.9* 2.5* 0.7 0.6 0.7       Blood Culture:   No results for input(s): "LABBLOO" in the last 48 hours.    BMP:   Recent Labs   Lab 10/31/23  0538   GLU 92      K 3.5   *   CO2 20*   BUN 9   CREATININE 1.0   CALCIUM 7.4*   MG 1.6       CBC:   Recent Labs   Lab 10/30/23  0555 10/31/23  0538   WBC 1.77* 19.57*   HGB 8.5* 9.1*   HCT 25.8* 27.2*   * 158       CMP:   Recent Labs   Lab 10/30/23  0555 10/31/23  0538    139   K 3.5 3.5   * 111*   CO2 19* 20*    92   BUN 10 9   CREATININE 1.0 1.0   CALCIUM 7.3* 7.4*   PROT  --  5.1*   ALBUMIN  --  1.9*   BILITOT  --  0.7   ALKPHOS  --  76   AST  --  23   ALT  --  14   ANIONGAP 8 8       Cardiac Markers:   No results for input(s): "CKMB", "MYOGLOBIN", "BNP", "TROPISTAT" in the last 48 hours.    Coagulation: No results for " "input(s): "PT", "INR", "APTT" in the last 48 hours.  Lactic Acid:   No results for input(s): "LACTATE" in the last 48 hours.    Lipase: No results for input(s): "LIPASE" in the last 48 hours.  Lipid Panel: No results for input(s): "CHOL", "HDL", "LDLCALC", "TRIG", "CHOLHDL" in the last 48 hours.  Magnesium:   Recent Labs   Lab 10/31/23  0538   MG 1.6     POCT Glucose:   Recent Labs   Lab 10/30/23  1638 10/30/23  1947 10/31/23  0722   POCTGLUCOSE 99 109 119*       Prealbumin: No results for input(s): "PREALBUMIN" in the last 48 hours.  Respiratory Culture: No results for input(s): "GSRESP", "RESPIRATORYC" in the last 48 hours.  Troponin:   No results for input(s): "TROPONINI", "TROPONINIHS" in the last 48 hours.    TSH:   Recent Labs   Lab 10/30/23  0838   TSH 2.165       Urine Culture: No results for input(s): "LABURIN" in the last 48 hours.    Urine Studies:   No results for input(s): "COLORU", "APPEARANCEUA", "PHUR", "SPECGRAV", "PROTEINUA", "GLUCUA", "KETONESU", "BILIRUBINUA", "OCCULTUA", "NITRITE", "UROBILINOGEN", "LEUKOCYTESUR", "RBCUA", "WBCUA", "BACTERIA", "SQUAMEPITHEL", "HYALINECASTS" in the last 48 hours.    Invalid input(s): "WRIGHTSUR"      Significant Imaging: I have reviewed all pertinent imaging results/findings within the past 24 hours.      Assessment/Plan:    10/31 PT on room air with oxygen 95%.  BP still on lower side.  Ultrasound and CT scan of RUE concerning for abscess/fluid collection.  Plan to transfer for I&D/orthopedic surgery consult.  Add vancomycin for MRSA coverage.   However patient does have other areas of fluid filled edema on LUE that has been going on for sometime and BLE.  Additionally he has hypoalbuminemia and this could contribute to low blood pressure.  H/o HF (Ef has improved) and pulmonary HTN.  Dietician consulted for recommendations  Did discuss with pt/daughter that low albumin levels in the setting of HF is a poor prognostic indicator.  He has had pancytopenia that " "could be explained by levofloxacin.  Neutropenia improved with neurogen.  However, patient has unexplained blood clots in the past and is currently anticoagulated.  Discussed that he should have oncology f/u.  Urology outpatient for urinary retention and 2 more days of augmentin for enterobacter UTI.  JACKELIN could be related to UTI/Urinary retention or by hypoperfusion due to intravascular volume depletion in the setting of hypoalbuminemia.  Hurtado catheter placed.  H/h improved and is close to baseline.      * Sepsis due to gram-negative UTI  This patient does have evidence of infective focus  My overall impression is sepsis.  Source: Respiratory and Urinary Tract  Antibiotics given-   Antibiotics (72h ago, onward)    Start     Stop Route Frequency Ordered    10/30/23 1850  vancomycin - pharmacy to dose  (vancomycin IVPB (PEDS and ADULTS))        See Hyperspace for full Linked Orders Report.    -- IV pharmacy to manage frequency 10/30/23 1750    10/30/23 1000  amoxicillin-clavulanate 500-125mg per tablet 500 mg         11/02/23 0859 Oral 2 times daily 10/30/23 0859    10/30/23 0630  vancomycin (VANCOCIN) 1,000 mg in dextrose 5 % (D5W) 250 mL IVPB (Vial-Mate)         -- IV Every 12 hours (non-standard times) 10/30/23 1758        Latest lactate reviewed-  No results for input(s): "LACTATE" in the last 72 hours.  Organ dysfunction indicated by Acute kidney injury and Acute heart failure    Fluid challenge Patient recevied 1 L bolus in the ED.  Will give IV fluids.  Pleural effusions seen on CTAP   Post- resuscitation assessment No - Post resuscitation assessment not needed       Will Not start Pressors- Levophed for MAP of 65  Source control achieved by: IV levofloxacin   Blood CX pending and urine culture gram negative rods     D/c levofloxacin and started augmentin 10/30/23 due to pancytopenia which is possibly antibx induced      Advanced care planning/counseling discussion  Patient DNR   Patient at risk for medical " decline.      MRSA infection  Recent MRSA infection to right hand  Edema and bruising present  Soft tissue u/s ordered--showing new areas of fluid collection. CT with contrast subcutaneous fluid collection or small abscess.  Wound care consulted  General surgery here can not operate on hands. Transfer for hand surgeon initiated.  Daughter requesting transfer as well.        Pancytopenia  Possible drug induced from levofloxacin.  Neupogen ordered this AM--may hold further doses.    Recent Labs   Lab 10/31/23  0538   HGB 9.1*   WBC 19.57*          Augmentin for UTI coverage and IV Vanc for MRSA coverage for hand    Recommend heme/onc f/u with unexplained PE per daughter and pancytopenia.  Pancytopenia could be related to levofloxacin but patient did not have leukocytosis in the setting of 3 infections (covid, UTI and possible RU abscess)     Pulmonary hypertension  Seen on Echo   Moderate PHTN   Pulmonary effusion   Careful hydration    Complicated urinary tract infection  U/A with 1+ leukocytes and 20 WBC on microscopic  Urine cultures klebsiella pneumoniae  Continue IV levofloxacin , home on po     10/30: D/c IV levofloxacin and start Augmentin.  Urine culture klebsiella pneumoniae pansensitive     Pleural effusion  BNP mildly elevated   Seen on CTAP moderate left and small right pleural effusion with adjacent atelectasis   Gentle hydration but at risk for increasing pulmonary edema/pleural effusions  O2 sats currently stable; no signs of respiratory distress    Holding off on IV fluids       COVID-19  Patient is identified as positive for COVID   Initiate standard COVID protocols; COVID-19 testing Collection Date: 10/12/2023 Collection Time:  12:01 PM ,Infection Control notification  and isolation- respiratory, contact and droplet per protocol    Diagnostics: daily labs     Management: none at this time.  Pt is not hypoxic     Advance Care Planning  Patient is a DNR.  Lapost in chart     Acute kidney injury  superimposed on chronic kidney disease  Patient with acute kidney injury/acute renal failure likely due to pre-renal azotemia due to dehydration JACKELIN is currently stable. Baseline creatinine 1.0 - Labs reviewed- Renal function/electrolytes with Estimated Creatinine Clearance: 67.1 mL/min (based on SCr of 1 mg/dL). according to latest data. Monitor urine output and serial BMP and adjust therapy as needed. Avoid nephrotoxins and renally dose meds for GFR listed above.    IV fluids.  In the setting of JACKELIN/UTI CT AP ordered and no renal stones seen.      Slowly improving     Back to baseline       Atrial fibrillation  Rate controlled  Continue eliquis     Macrocytic anemia    Current CBC reviewed-   Lab Results   Component Value Date    HGB 9.1 (L) 10/31/2023    HCT 27.2 (L) 10/31/2023     Monitor serial CBC and transfuse if patient becomes hemodynamically unstable, symptomatic or H/H drops below 8.    Not far from baseline.  Anemia work up no obvious deficiency.      Acute pulmonary embolism  Continue home Eliquis       Type 2 diabetes mellitus with circulatory disorder  Patient's FSGs are controlled on current medication regimen.  Last A1c reviewed-   Lab Results   Component Value Date    HGBA1C 5.4 10/02/2023     Most recent fingerstick glucose reviewed-   Recent Labs   Lab 10/30/23  1137 10/30/23  1638 10/30/23  1947 10/31/23  0722   POCTGLUCOSE 110 99 109 119*     Current correctional scale  Low  Maintain anti-hyperglycemic dose as follows-   Antihyperglycemics (From admission, onward)    Start     Stop Route Frequency Ordered    10/26/23 1315  insulin aspart U-100 pen 0-5 Units         -- SubQ Before meals & nightly PRN 10/26/23 1216        Hold Oral hypoglycemics while patient is in the hospital.      VTE Risk Mitigation (From admission, onward)         Ordered     apixaban tablet 5 mg  2 times daily         10/26/23 0859     IP VTE HIGH RISK PATIENT  Once         10/26/23 0045     Place sequential compression  device  Until discontinued         10/26/23 0045                Discharge Planning   EDMOND: 10/30/2023     Code Status: DNR   Is the patient medically ready for discharge?:     Reason for patient still in hospital (select all that apply): Patient trending condition  Discharge Plan A: Skilled Nursing Facility                  Lori Long PA-C  Department of Hospital Medicine   [place] [facility] (3rd Fl)

## 2023-10-31 NOTE — PLAN OF CARE
Problem: Occupational Therapy  Goal: Occupational Therapy Goal  Description: Pt to perform UB dressing with MOD A seated EOB.  Pt to perform self toileting with MOD A using BSC.  Pt to perform level functional transfers required for ADL's with CGA, RW level.  Pt to demonstrate Fair+ dynamic seated balance as required to perform ADL's from standing level.  Pt to demonstrate Fair+ BUE strength during functional task   Pt to participate in seated EOB ADL activity for increased activity tolerance and safety upon discharge.    Outcome: Ongoing, Progressing   Cont with OT POC

## 2023-10-31 NOTE — ASSESSMENT & PLAN NOTE
"This patient does have evidence of infective focus  My overall impression is sepsis.  Source: Respiratory and Urinary Tract  Antibiotics given-   Antibiotics (72h ago, onward)    Start     Stop Route Frequency Ordered    10/30/23 1945  vancomycin (VANCOCIN) 1,000 mg in dextrose 5 % (D5W) 250 mL IVPB (Vial-Mate)        See Hyperspace for full Linked Orders Report.    -- IV Once 10/30/23 1756    10/30/23 1850  vancomycin - pharmacy to dose  (vancomycin IVPB (PEDS and ADULTS))        See Hyperspace for full Linked Orders Report.    -- IV pharmacy to manage frequency 10/30/23 1750    10/30/23 1815  vancomycin 1,250 mg in dextrose 5 % (D5W) 250 mL IVPB (Vial-Mate)        See Hyperspace for full Linked Orders Report.    -- IV Once 10/30/23 1756    10/30/23 1000  amoxicillin-clavulanate 500-125mg per tablet 500 mg         11/02/23 0859 Oral 2 times daily 10/30/23 0859    10/30/23 0630  vancomycin (VANCOCIN) 1,000 mg in dextrose 5 % (D5W) 250 mL IVPB (Vial-Mate)         -- IV Every 12 hours (non-standard times) 10/30/23 1758    10/26/23 0900  mupirocin 2 % ointment         10/31/23 0859 Nasl 2 times daily 10/26/23 0046        Latest lactate reviewed-  No results for input(s): "LACTATE" in the last 72 hours.  Organ dysfunction indicated by Acute kidney injury and Acute heart failure    Fluid challenge Patient recevied 1 L bolus in the ED.  Will give IV fluids.  Pleural effusions seen on CTAP   Post- resuscitation assessment No - Post resuscitation assessment not needed       Will Not start Pressors- Levophed for MAP of 65  Source control achieved by: IV levofloxacin   Blood CX pending and urine culture gram negative rods     D/c levofloxacin and started augmentin 10/30/23 due to pancytopenia which is possibly antibx induced    "

## 2023-10-31 NOTE — PLAN OF CARE
[place] - Med Surg (3rd Fl)  Discharge Final Note    Primary Care Provider: Nigel Webb MD    Expected Discharge Date: 10/30/2023    Final Discharge Note (most recent)       Final Note - 10/31/23 1443          Final Note    Assessment Type Final Discharge Note (P)      Anticipated Discharge Disposition Short Term Hospital (P)         Post-Acute Status    Post-Acute Authorization Placement (P)      Post-Acute Placement Status Pending post-acute provider review/more information requested (P)                      Important Message from Medicare  Important Message from Medicare regarding Discharge Appeal Rights: Explained to patient/caregiver, Other (comments) (complete via phone with daughter due to patient being in Covid 19 precautions.)     Date IMM was signed: 10/31/23  Time IMM was signed: 1003    Contact Info       [employer]        Next Steps: Follow up    Chantell Abbasi NP   Specialty: Cardiology    [address] [facility] [address]   Phone: [phone]       Next Steps: Follow up in 3 day(s)            [place] [facility] [place] level [place]. He will go to room 456. Rahel, daughter, aware.   Once patient is medically stable, he will transition back to [facility] for continued skilled care, as long as payor allows.   [place] aware of transfer to [facility].

## 2023-10-31 NOTE — ASSESSMENT & PLAN NOTE
BNP mildly elevated   Seen on CTAP moderate left and small right pleural effusion with adjacent atelectasis   Gentle hydration but at risk for increasing pulmonary edema/pleural effusions  O2 sats currently stable; no signs of respiratory distress    Holding off on IV fluids

## 2023-10-31 NOTE — ASSESSMENT & PLAN NOTE
Possible drug induced from levofloxacin.  Neupogen ordered this AM--may hold further doses.    Recent Labs   Lab 10/30/23  0555   HGB 8.5*   WBC 1.77*   *       Augmentin for UTI coverage and IV Vanc for MRSA coverage for hand

## 2023-10-31 NOTE — PLAN OF CARE
10/31/23 1007   Medicare Message   Important Message from Medicare regarding Discharge Appeal Rights Explained to patient/caregiver;Other (comments)  (complete via phone with daughter due to patient being in Covid 19 precautions.)   Date IMM was signed 10/31/23   Time IMM was signed 1003

## 2023-10-31 NOTE — ASSESSMENT & PLAN NOTE
Recent MRSA infection to right hand  Edema and bruising present  Soft tissue u/s ordered--showing new areas of fluid collection. CT with contrast subcutaneous fluid collection or small abscess.  Wound care consulted  General surgery here can not operate on hands. Transfer for hand surgeon initiated.  Daughter requesting transfer as well.

## 2023-10-31 NOTE — ASSESSMENT & PLAN NOTE
Current CBC reviewed-   Lab Results   Component Value Date    HGB 9.1 (L) 10/31/2023    HCT 27.2 (L) 10/31/2023     Monitor serial CBC and transfuse if patient becomes hemodynamically unstable, symptomatic or H/H drops below 8.    Not far from baseline.  Anemia work up no obvious deficiency.

## 2023-10-31 NOTE — PT/OT/SLP PROGRESS
"Physical Therapy Treatment    Patient Name:  Debbie Zepeda   MRN:  [id number]    Recommendations:     Discharge Recommendations: Moderate Intensity Therapy  Discharge Equipment Recommendations: other (see comments) (will continue to assess.)  Barriers to discharge: on going medical tx    Assessment:     Debbie Zepeda is a 88 y.o. male admitted with a medical diagnosis of Sepsis due to gram-negative UTI.  He presents with the following impairments/functional limitations: weakness, impaired endurance, impaired self care skills, impaired functional mobility, gait instability, impaired balance, decreased upper extremity function, decreased lower extremity function, impaired cognition, decreased safety awareness, pain, decreased ROM, impaired skin, impaired fine motor, edema. Patient noted edema on B UE/LE with requires increase assistance with bed mobility/ repositioning today. Patient keeps telling the P Therapist , "I can't do it".    Rehab Prognosis: Fair; patient would benefit from acute skilled PT services to address these deficits and reach maximum level of function.    Recent Surgery: * No surgery found *      Plan:     During this hospitalization, patient to be seen 5 x/week to address the identified rehab impairments via gait training, therapeutic activities, therapeutic exercises and progress toward the following goals:    Plan of Care Expires:  11/10/23    Subjective     Chief Complaint: right had pain; general body weakness.  Patient/Family Comments/goals: none  Pain/Comfort:  Pain Rating 1:  (did not quantify)  Location - Side 1: Right  Location 1: hand  Pain Addressed 1: Cessation of Activity, Reposition  Pain Rating Post-Intervention 1:  (did not quantify)      Objective:     Communicated with patient prior to session.  Patient found HOB elevated with peripheral IV, telemetry, SCD, bed alarm, lugo catheter upon PT entry to room.     General Precautions: Standard, airborne, contact, droplet, " fall  Orthopedic Precautions: N/A  Braces: N/A  Respiratory Status: Room air     Functional Mobility:  Bed Mobility:     Rolling Left:  dependence  Rolling Right: dependence  Scooting: dependence  Supine to Sit: unable      AM-PAC 6 CLICK MOBILITY  Turning over in bed (including adjusting bedclothes, sheets and blankets)?: 2       Treatment & Education:  Provided Passive to Active Assisted ROM ex on B LE x 10 reps on each and rolling to sides x 5 reps with dependent assistance. Repositioned pt on the bed.     Patient left HOB elevated with all lines intact, call button in reach, bed alarm on, and nursing/medical team notified..    GOALS:   Multidisciplinary Problems       Physical Therapy Goals          Problem: Physical Therapy    Goal Priority Disciplines Outcome Goal Variances Interventions   Physical Therapy Goal     PT, PT/OT Ongoing, Progressing     Description:   Patient will increase functional independence with mobility by performin. Rolling side <> side  with minimal assistance.  2. Supine <> sit with minimal Assistance  3. Sit <>Stand with Minimal Assistance using RW  4. Bed to chair transfer with Minimal Assistance with a  rolling walker using Stand Pivot TECHNIQUE  5. Gait  x ~10  feet with Moderate Assistance with a  rolling walker  6. Lower extremity exercise program x10 reps  with assistance as needed.                          Time Tracking:     PT Received On: 10/31/23  PT Start Time: 910     PT Stop Time: 930  PT Total Time (min): 20 min     Billable Minutes: Therapeutic Activity 20    Treatment Type: Treatment  PT/PTA: PT           10/31/2023

## 2023-10-31 NOTE — PLAN OF CARE
10/31/23 1007   Rounds   Attendance Provider;Nurse    Discharge Plan A Skilled Nursing Facility   Why the patient remains in the hospital Requires continued medical care   Transition of Care Barriers None         Upon rounds today it was dicussed that patient will need transfer for a higher level of care (ortho). Transfer was initiated last night, after Dr. Resendiz received the results of the CT imaging. Discharge destination goal is [facility], as this is where the hand surgery was taken place previously.   CM did speak with Daughter, Rahel, to give the update. At this time, per intake notes, it appears hospital medicine accepted patient at [facility], but there are no beds currently. They also state they are having administration review.   CM to continue to follow and assist as needed.

## 2023-10-31 NOTE — ASSESSMENT & PLAN NOTE
Patient's FSGs are controlled on current medication regimen.  Last A1c reviewed-   Lab Results   Component Value Date    HGBA1C 5.4 10/02/2023     Most recent fingerstick glucose reviewed-   Recent Labs   Lab 10/29/23  2009 10/30/23  0737 10/30/23  1137 10/30/23  1638   POCTGLUCOSE 124* 116* 110 99     Current correctional scale  Low  Maintain anti-hyperglycemic dose as follows-   Antihyperglycemics (From admission, onward)    Start     Stop Route Frequency Ordered    10/26/23 1315  insulin aspart U-100 pen 0-5 Units         -- SubQ Before meals & nightly PRN 10/26/23 1216        Hold Oral hypoglycemics while patient is in the hospital.

## 2023-10-31 NOTE — ASSESSMENT & PLAN NOTE
Possible drug induced from levofloxacin.  Neupogen ordered this AM--may hold further doses.    Recent Labs   Lab 10/31/23  0538   HGB 9.1*   WBC 19.57*          Augmentin for UTI coverage and IV Vanc for MRSA coverage for hand    Recommend heme/onc f/u with unexplained PE per daughter and pancytopenia.  Pancytopenia could be related to levofloxacin but patient did not have leukocytosis in the setting of 3 infections (covid, UTI and possible RU abscess)

## 2023-11-03 VITALS
RESPIRATION RATE: 18 BRPM | OXYGEN SATURATION: 97 % | BODY MASS INDEX: 28.32 KG/M2 | SYSTOLIC BLOOD PRESSURE: 107 MMHG | DIASTOLIC BLOOD PRESSURE: 56 MMHG | TEMPERATURE: 97 F | WEIGHT: 220.69 LBS | HEIGHT: 74 IN | HEART RATE: 60 BPM

## 2023-11-03 NOTE — PHYSICIAN QUERY
PT Name: Debbie Zepeda  MR #: [id number]     DOCUMENTATION CLARIFICATION      CDS: Sixto Mortensen RN CCDS               Contact information: [email or web address]      This form is a permanent document in the medical record.     Query Date: November 3, 2023    By submitting this query, we are merely seeking further clarification of documentation to reflect the severity of illness of your patient. Please utilize your independent clinical judgment when addressing the question(s) below.    The Medical Record contains the following:     Indicators   Supporting Clinical Findings Location in Medical Record     x Documentation of condition Sepsis due to gram-negative UTI 10/26/2023 H&P     x Urinary Device, Catheter chronic lugo 10/26/2023 Nursing notes     x Lab Value(s)  10/25/23 15:25 10/26/23 13:05 10/27/23 05:46 10/30/23 05:55 10/31/23 05:38   WBC 4.94 3.62 (L) 2.87 (L) 1.77 (LL) 19.57 (H)    Lab values     x UA Results  10/25/23 15:41   Specimen UA Urine, Unspecified   Color, UA Yellow   Appearance, UA Hazy    Specific [place], UA 1.015   pH, UA 5.0   Protein, UA Negative   Glucose, UA Negative   Ketones, UA Negative   Occult Blood UA 1+    NITRITE UA Positive    UROBILINOGEN UA Negative   Bilirubin (UA) Negative   Leukocytes, UA 1+    RBC, UA 4   WBC, UA 20 (H)   Bacteria, UA Many    WBC Clumps, UA Occasional    Yeast, UA Few     Lab values     x Cultures Urine culture is positive for KLEBSIELLA PNEUMONIAE >100,000 cfu/ml  Susceptibility   Klebsiella pneumoniae    CULTURE, URINE    Levofloxacin <=2 mcg/mL Sensitive    10/25/2023 Microbiology report     x Treatment/Medication Pt cleansed of incontinence. Lugo replaced.   Patient given Levaquin IV  Continue levofloxacin.  Home on levaquin for 3 more days 10/25/2023 ER nursing note  10/25/2023 ED provider notes  10/28/2023 Hospital Medicine progress notes     x Other presents to the emergency room brought in by EMS from the [place].  Patient was sent over for  fever, hypoxia, generalized weakness and known positive COVID test recently. Patient states that his back is hurting him.  He appears dehydrated. 10/25/2023 ED provider notes      Provider, please clarify if there is any clinical correlation between UTI and Indwelling Hurtado Catheter.    [  X ] Due to or associated with each other   [   ] Unrelated to each other   [   ] Other explanation (please specify): _____________   [   ] Clinically undetermined       Please document in your progress notes daily for the duration of treatment until resolved, and include in your discharge summary.    Form No. [id number]

## 2023-11-06 PROBLEM — R63.8 INCREASED NUTRITIONAL NEEDS: Status: ACTIVE | Noted: 2023-11-06

## 2023-11-07 PROBLEM — J69.0 ASPIRATION PNEUMONIA: Status: ACTIVE | Noted: 2023-11-07

## 2023-11-08 PROBLEM — F11.20 OPIATE DEPENDENCE: Status: ACTIVE | Noted: 2023-11-08

## 2023-11-08 PROBLEM — R54 AGE-RELATED PHYSICAL DEBILITY: Status: ACTIVE | Noted: 2023-11-08

## 2023-11-08 PROBLEM — I87.2 CHRONIC VENOUS STASIS DERMATITIS OF BOTH LOWER EXTREMITIES: Status: ACTIVE | Noted: 2023-11-08

## 2023-11-08 PROBLEM — I74.8 SUBCLAVIAN ARTERY THROMBOSIS: Status: ACTIVE | Noted: 2023-11-08

## 2023-11-09 PROBLEM — R60.1 ANASARCA: Status: ACTIVE | Noted: 2023-11-09

## 2023-11-10 PROBLEM — I89.0 LYMPHEDEMA: Status: ACTIVE | Noted: 2023-11-09

## 2023-11-11 PROBLEM — I80.8 PHLEBITIS OF LEFT ARM: Status: ACTIVE | Noted: 2023-11-11

## 2023-11-15 PROBLEM — U07.1 LAB TEST POSITIVE FOR DETECTION OF COVID-19 VIRUS: Status: RESOLVED | Noted: 2023-10-26 | Resolved: 2023-11-15

## 2024-01-29 PROBLEM — N18.9 ACUTE KIDNEY INJURY SUPERIMPOSED ON CHRONIC KIDNEY DISEASE: Status: RESOLVED | Noted: 2023-10-26 | Resolved: 2024-01-29

## 2024-01-29 PROBLEM — N39.0 COMPLICATED URINARY TRACT INFECTION: Status: RESOLVED | Noted: 2023-10-26 | Resolved: 2024-01-29

## 2024-01-29 PROBLEM — I26.99 ACUTE PULMONARY EMBOLISM: Status: RESOLVED | Noted: 2019-02-15 | Resolved: 2024-01-29

## 2024-01-29 PROBLEM — A41.50 SEPSIS DUE TO GRAM-NEGATIVE UTI: Status: RESOLVED | Noted: 2023-10-26 | Resolved: 2024-01-29

## 2024-01-29 PROBLEM — N17.9 ACUTE KIDNEY INJURY SUPERIMPOSED ON CHRONIC KIDNEY DISEASE: Status: RESOLVED | Noted: 2023-10-26 | Resolved: 2024-01-29

## 2024-01-29 PROBLEM — N39.0 SEPSIS DUE TO GRAM-NEGATIVE UTI: Status: RESOLVED | Noted: 2023-10-26 | Resolved: 2024-01-29

## 2024-02-12 PROBLEM — I63.9 CVA (CEREBRAL VASCULAR ACCIDENT): Status: RESOLVED | Noted: 2021-02-02 | Resolved: 2024-02-12

## 2024-12-27 NOTE — HOSPITAL COURSE
"Progress Note - Acute Pain   Name: Diogo Travis 60 y.o. male I MRN: [id number]  Unit/Bed#: ICU 10 I Date of Admission: 12/24/2024   Date of Service: 12/27/2024 I Hospital Day: 2    Assessment & Plan  Acute pain due to trauma    Right 4-7 rib fractures      Plan:  Continue thoracic epidural infusion 0.1% ropivacaine/2mcg/ml fentanyl @8/5/10/3 (placed 12/25)  Plan for potential hold trial for possible epidural removal tomorrow 12/28    OK to continue SQ heparin for DVT prophylaxis - please hold morning dose on 12/28  Discontinued ketamine gtt 12/25  Continue MMA as below    Multimodal Analgesia:  Tylenol 975 mg every 8 hours scheduled  Oral Valium 5 mg  changed to every 6 hours as needed, for spasm related pain/anxiety  Gabapentin 300 mg 3 times daily  IV Toradol 15 mg every 6 hours scheduled x 2 days - now completed  Lidoderm patch 12 hours on/12 hours off to affected area  Robaxin 1000 mg every 8 hours scheduled  Oxycodone 5 mg every 4 hours as needed for moderate pain  Oxycodone 10 mg every 4 hours as needed for severe pain  IV Dilaudid 0.5 mg every 2 hours as needed for breakthrough pain  Narcan as needed for respiratory depression/opioid reversal  Continue bowel regimen while utilizing opioids to prevent opioid-induced constipation  Multiple rib fractures  Patient presented 12/24 s/p fall.   Patient reports he may have fallen 3 days prior and developed severe pain prompting him to seek medical attention    CT imaging 12/24: \"Acute to subacute minimally displaced fractures of the right fourth through sixth ribs and nondisplaced fracture of the right seventh rib.\"    Currently maintaining oxygen saturations on room air, denies shortness of breath and able to inspire 2500 mL with spirometry.     Rib Fracture Evaluation:  Chest tube: none  Respiratory Co-morbidities: Obesity  SpO2:   SPO2 RA Rest      Flowsheet Row ED to Hosp-Admission (Current) from 12/24/2024 in [facility] Intensive Care Unit " PT admitted yesterday for urosepsis.  BP on lower side.  Gentle hydration.  JACKELIN mildly improving.  Will continue to monitor fluid status.  He is at high risk for fluid overload due to pulmonary HTN.  Additionally pleural effusions seen on CXR.      Urine culture gram negative rods.  Continue levofloxacin.      Urine is nearly pan sensitive. Will go home today on flouroquinolone ]      10/29  D/c home yesterday but NH denied to take him. Can't take a skilled pt on the weekends is what I was told. Should go home tomorrow  No issues overnight   AFVSS     10/30 discharge back to NH held today. PT bp on lower side still.  Pancytopenia.  Will d/c levofloxacin and switch to augmentin for 3 more days for UTI treatment.  H/H on lower side and will trend.  RUE with continued edema.  RUQ u/s ordered showing concern for underlying abscess; repeat CT with contrast ordered and pending.  General surgery here can not see hand patients. Started Vanc given prior MRSA infection of hand. Transfer initiated to return to [facility] for ortho care.     10/31 PT on room air with oxygen 95%.  BP still on lower side.  Ultrasound and CT scan of RUE concerning for abscess/fluid collection.  Plan to transfer for I&D/orthopedic surgery consult.  Add vancomycin for MRSA coverage.   However patient does have other areas of fluid filled edema on LUE that has been going on for sometime and BLE.  Additionally he has hypoalbuminemia and this could contribute to low blood pressure.  H/o HF (Ef has improved) and pulmonary HTN.  Dietician consulted for recommendations  Did discuss with pt/daughter that low albumin levels in the setting of HF is a poor prognostic indicator.  He has had pancytopenia that could be explained by levofloxacin.  Neutropenia improved with neurogen.  However, patient has unexplained blood clots in the past and is currently anticoagulated.  Discussed that he should have oncology f/u.  Urology outpatient for urinary retention and 2 more  "  SpO2 94 %   SpO2 Activity At Rest   O2 Device Nasal cannula   O2 Flow Rate --                                                                             Incentive Spirometer: Incentive Spirometry Achieved (mL): 2250 mL   Platelet Count:   Results from last 7 days   Lab Units 12/25/24  0425   PLATELETS Thousands/uL 153     Coags:   Results from last 7 days   Lab Units 12/25/24  0425   INR  1.00   PROTIME seconds 13.9     Home anticoagulants: none  VTE covered by:    None      Alcohol use disorder  Patient with hx of AUD. Has had multiple hospitalizations recently for alcohol withdrawal.  Reports drinking at least \"half a bottle\" of bourbon and wine daily  Patient reports he is interested in cessation  Continue CIWA.  CM consultation for CATCH program    Depression  Patients with depression and/or anxiety have more perceived pain on average and often require higher doses of opioid pain medication.  Treat depression and/or anxiety aggressively.      APS will continue to follow. Please contact Acute Pain Service - via SecureChat from [phone] with additional questions or concerns. See SecureChat or [employer]in[employer] for additional contacts and after hours information.     Subjective   Diogo Travis is a 60 y.o. male who presents with history of alcohol use disorder. Patient reports he sustained a fall possibly 3 days ago and was found to have 4-7 right-sided rib fractures. Of note patient has had multiple hospitalizations recently for alcohol withdrawal.     Pain History  Current pain location(s):  Pain Score: 7  Pain Location/Orientation: Location: Rib Cage  Pain Scale: Pain Assessment Tool: 0-10  24 hour history: Seen sitting up in chair at bedside. Continues to report overall improvement in pain since epidural placement. Did report an exacerbation of chest wall and shoulder pain overnight and this morning which has improved following use of demand doses through PCEA. Denies SOB, maintaining oxygen saturations on RA, and " days of augmentin for enterobacter UTI.  JACKELIN could be related to UTI/Urinary retention or by hypoperfusion due to intravascular volume depletion in the setting of hypoalbuminemia.  Hurtado catheter placed.  H/h improved and is close to baseline.     inspiring 2500 mL with spirometry. Encouraged use of demand dosing with PCEA. Patient is eager to discharge home as soon as possible so we discussed possible epidural removal tomorrow vs. Sunday.     Opioid requirement previous 24 hours:   PCEA utilization: appropriate    Meds/Allergies   all current active meds have been reviewed, current meds:   Current Facility-Administered Medications:     acetaminophen (TYLENOL) tablet 975 mg, Q8H COLBY    atorvastatin (LIPITOR) tablet 40 mg, Daily With Dinner    diazepam (VALIUM) tablet 5 mg, Q6H PRN    escitalopram (LEXAPRO) tablet 20 mg, Daily    folic acid (FOLVITE) tablet 1 mg, Daily    gabapentin (NEURONTIN) capsule 300 mg, TID    HYDROmorphone (DILAUDID) injection 0.5 mg, Q2H PRN    lisinopril (ZESTRIL) tablet 40 mg, Daily    methocarbamol (ROBAXIN) tablet 1,000 mg, Q8H    mirtazapine (REMERON) tablet 15 mg, HS    multivitamin-minerals (CENTRUM) tablet 1 tablet, Daily    naloxone (NARCAN) 0.04 mg/mL syringe 0.04 mg, Q1MIN PRN    nicotine (NICODERM CQ) 21 mg/24 hr TD 24 hr patch 1 patch, Daily    ondansetron (ZOFRAN) injection 4 mg, Q4H PRN    oxyCODONE (ROXICODONE) IR tablet 5 mg, Q4H PRN **OR** oxyCODONE (ROXICODONE) immediate release tablet 10 mg, Q4H PRN    pantoprazole (PROTONIX) EC tablet 40 mg, Daily Before Breakfast    polyethylene glycol (MIRALAX) packet 17 g, Daily PRN    ropivacaine 0.1% and fentaNYL 2 mcg/mL PCEA, Continuous    senna-docusate sodium (SENOKOT S) 8.6-50 mg per tablet 1 tablet, HS    thiamine tablet 100 mg, Daily, and PTA meds:   Prior to Admission Medications   Prescriptions Last Dose Informant Patient Reported? Taking?   Magnesium 400 MG CAPS   Yes No   Sig: Take 1 capsule by mouth in the morning   Multiple Vitamin (multivitamin) tablet   No No   Sig: Take 1 tablet by mouth daily   Omega-3 Fatty Acids (fish oil) 1,000 mg   Yes No   Sig: Take 2,000 mg by mouth daily   Thiamine Mononitrate (VITAMIN B1) 100 mg tablet   No No   Sig: Take 1 tablet  (100 mg total) by mouth daily   atorvastatin (LIPITOR) 40 mg tablet   No No   Sig: Take 1 tablet (40 mg total) by mouth daily with dinner   escitalopram (LEXAPRO) 20 mg tablet   No No   Sig: Take 1 tablet (20 mg total) by mouth daily   folic acid (FOLVITE) 1 mg tablet   No No   Sig: Take 1 tablet (1 mg total) by mouth daily   hydrOXYzine HCL (ATARAX) 25 mg tablet   No No   Sig: Take 1 tablet (25 mg total) by mouth every 6 (six) hours as needed for itching   lisinopril (ZESTRIL) 40 mg tablet   No No   Sig: Take 1 tablet (40 mg total) by mouth daily   metroNIDAZOLE (METROCREAM) 0.75 % cream   No No   Sig: Apply topically 2 (two) times a day   mirtazapine (REMERON) 15 mg tablet   No No   Sig: Take 1 tablet (15 mg total) by mouth daily at bedtime   pantoprazole (PROTONIX) 40 mg tablet   No No   Sig: Take 1 tablet (40 mg total) by mouth daily in the early morning      Facility-Administered Medications: None     Allergies   Allergen Reactions    Cefdinir Rash     Objective :  Temp:  [99.1 °F (37.3 °C)-99.6 °F (37.6 °C)] 99.6 °F (37.6 °C)  HR:  [] 78  BP: (115-151)/(68-93) 119/70  Resp:  [9-19] 14  SpO2:  [91 %-95 %] 92 %  O2 Device: None (Room air)    Physical Exam  Vitals reviewed.   HENT:      Head: Normocephalic.      Mouth/Throat:      Mouth: Mucous membranes are dry.   Eyes:      Extraocular Movements: Extraocular movements intact.   Cardiovascular:      Rate and Rhythm: Normal rate.      Pulses: Normal pulses.   Pulmonary:      Effort: Pulmonary effort is normal.   Chest:      Chest wall: Tenderness (Right) present.   Abdominal:      General: Abdomen is flat.   Musculoskeletal:         General: Normal range of motion.      Cervical back: Normal range of motion.   Skin:     General: Skin is dry.   Neurological:      General: No focal deficit present.      Mental Status: He is alert and oriented to person, place, and time.   Psychiatric:         Mood and Affect: Mood normal.            Lab Results: I have  reviewed the following results:  Estimated Creatinine Clearance: 99.2 mL/min (by C-G formula based on SCr of 0.93 mg/dL).  Lab Results   Component Value Date    WBC 5.05 12/25/2024    HGB 12.7 12/25/2024    HCT 37.5 12/25/2024     12/25/2024         Component Value Date/Time    K 4.0 12/26/2024 0517    K 3.7 12/27/2022 1818     12/26/2024 0517     12/27/2022 1818    CO2 31 12/26/2024 0517    CO2 15 (L) 12/12/2023 1611    CO2 23 12/27/2022 1818    BUN 17 12/26/2024 0517    BUN 10 12/27/2022 1818    CREATININE 0.93 12/26/2024 0517    CREATININE 0.92 12/27/2022 1818         Component Value Date/Time    CALCIUM 9.1 12/26/2024 0517    CALCIUM 9.7 12/27/2022 1818    ALKPHOS 102 12/24/2024 0507    ALKPHOS 93 12/27/2022 1818    AST 46 (H) 12/24/2024 0507    AST 51 (H) 12/27/2022 1818    ALT 24 12/24/2024 0507    ALT 41 12/27/2022 1818    TP 7.6 12/24/2024 0507    TP 7.9 12/27/2022 1818    ALB 4.5 12/24/2024 0507    ALB 4.7 12/27/2022 1818       Imaging Results Review: No pertinent imaging studies reviewed.  Other Study Results Review: No additional pertinent studies reviewed.